# Patient Record
Sex: MALE | Race: OTHER | URBAN - METROPOLITAN AREA
[De-identification: names, ages, dates, MRNs, and addresses within clinical notes are randomized per-mention and may not be internally consistent; named-entity substitution may affect disease eponyms.]

---

## 2018-01-15 ENCOUNTER — EMERGENCY (EMERGENCY)
Facility: HOSPITAL | Age: 23
LOS: 1 days | Discharge: HOME | End: 2018-01-15
Attending: EMERGENCY MEDICINE | Admitting: EMERGENCY MEDICINE
Payer: OTHER GOVERNMENT

## 2018-01-15 ENCOUNTER — EMERGENCY (EMERGENCY)
Facility: HOSPITAL | Age: 23
LOS: 0 days | Discharge: ANOTHER HOSPITAL | End: 2018-01-15
Attending: EMERGENCY MEDICINE
Payer: OTHER GOVERNMENT

## 2018-01-15 VITALS
HEIGHT: 68 IN | TEMPERATURE: 99 F | RESPIRATION RATE: 16 BRPM | SYSTOLIC BLOOD PRESSURE: 158 MMHG | OXYGEN SATURATION: 100 % | DIASTOLIC BLOOD PRESSURE: 111 MMHG | WEIGHT: 190.04 LBS | HEART RATE: 81 BPM

## 2018-01-15 VITALS
HEART RATE: 77 BPM | SYSTOLIC BLOOD PRESSURE: 178 MMHG | RESPIRATION RATE: 16 BRPM | DIASTOLIC BLOOD PRESSURE: 102 MMHG | TEMPERATURE: 98 F | OXYGEN SATURATION: 99 %

## 2018-01-15 VITALS — HEART RATE: 89 BPM | DIASTOLIC BLOOD PRESSURE: 94 MMHG | RESPIRATION RATE: 18 BRPM | SYSTOLIC BLOOD PRESSURE: 139 MMHG

## 2018-01-15 VITALS
TEMPERATURE: 98 F | HEART RATE: 80 BPM | DIASTOLIC BLOOD PRESSURE: 110 MMHG | SYSTOLIC BLOOD PRESSURE: 160 MMHG | OXYGEN SATURATION: 100 % | RESPIRATION RATE: 18 BRPM

## 2018-01-15 DIAGNOSIS — H05.232 HEMORRHAGE OF LEFT ORBIT: ICD-10-CM
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
## 2018-01-15 DIAGNOSIS — S02.32XS FRACTURE OF ORBITAL FLOOR, LEFT SIDE, SEQUELA: ICD-10-CM

## 2018-01-15 DIAGNOSIS — M79.89 OTHER SPECIFIED SOFT TISSUE DISORDERS: ICD-10-CM

## 2018-01-15 LAB
ALBUMIN SERPL ELPH-MCNC: 4.3 G/DL — SIGNIFICANT CHANGE UP (ref 3.3–5)
ALP SERPL-CCNC: 113 U/L — SIGNIFICANT CHANGE UP (ref 40–120)
ALT FLD-CCNC: 187 U/L — HIGH (ref 12–78)
ANION GAP SERPL CALC-SCNC: 8 MMOL/L — SIGNIFICANT CHANGE UP (ref 5–17)
APTT BLD: 34.1 SEC — SIGNIFICANT CHANGE UP (ref 27.5–37.4)
AST SERPL-CCNC: 77 U/L — HIGH (ref 15–37)
BASOPHILS # BLD AUTO: 0.1 K/UL — SIGNIFICANT CHANGE UP (ref 0–0.2)
BASOPHILS NFR BLD AUTO: 1.2 % — SIGNIFICANT CHANGE UP (ref 0–2)
BILIRUB SERPL-MCNC: 0.5 MG/DL — SIGNIFICANT CHANGE UP (ref 0.2–1.2)
BUN SERPL-MCNC: 12 MG/DL — SIGNIFICANT CHANGE UP (ref 7–23)
CALCIUM SERPL-MCNC: 9.5 MG/DL — SIGNIFICANT CHANGE UP (ref 8.5–10.1)
CHLORIDE SERPL-SCNC: 103 MMOL/L — SIGNIFICANT CHANGE UP (ref 96–108)
CO2 SERPL-SCNC: 25 MMOL/L — SIGNIFICANT CHANGE UP (ref 22–31)
CREAT SERPL-MCNC: 0.81 MG/DL — SIGNIFICANT CHANGE UP (ref 0.5–1.3)
EOSINOPHIL # BLD AUTO: 0.2 K/UL — SIGNIFICANT CHANGE UP (ref 0–0.5)
EOSINOPHIL NFR BLD AUTO: 1.8 % — SIGNIFICANT CHANGE UP (ref 0–6)
GLUCOSE SERPL-MCNC: 98 MG/DL — SIGNIFICANT CHANGE UP (ref 70–99)
HCT VFR BLD CALC: 45.9 % — SIGNIFICANT CHANGE UP (ref 39–50)
HGB BLD-MCNC: 15.2 G/DL — SIGNIFICANT CHANGE UP (ref 13–17)
INR BLD: 1.07 RATIO — SIGNIFICANT CHANGE UP (ref 0.88–1.16)
LYMPHOCYTES # BLD AUTO: 28.2 % — SIGNIFICANT CHANGE UP (ref 13–44)
LYMPHOCYTES # BLD AUTO: 3.5 K/UL — HIGH (ref 1–3.3)
MCHC RBC-ENTMCNC: 29.8 PG — SIGNIFICANT CHANGE UP (ref 27–34)
MCHC RBC-ENTMCNC: 33.2 GM/DL — SIGNIFICANT CHANGE UP (ref 32–36)
MCV RBC AUTO: 89.9 FL — SIGNIFICANT CHANGE UP (ref 80–100)
MONOCYTES # BLD AUTO: 0.7 K/UL — SIGNIFICANT CHANGE UP (ref 0–0.9)
MONOCYTES NFR BLD AUTO: 5.6 % — SIGNIFICANT CHANGE UP (ref 2–14)
NEUTROPHILS # BLD AUTO: 7.9 K/UL — HIGH (ref 1.8–7.4)
NEUTROPHILS NFR BLD AUTO: 63.3 % — SIGNIFICANT CHANGE UP (ref 43–77)
PLATELET # BLD AUTO: 383 K/UL — SIGNIFICANT CHANGE UP (ref 150–400)
POTASSIUM SERPL-MCNC: 4 MMOL/L — SIGNIFICANT CHANGE UP (ref 3.5–5.3)
POTASSIUM SERPL-SCNC: 4 MMOL/L — SIGNIFICANT CHANGE UP (ref 3.5–5.3)
PROT SERPL-MCNC: 8.6 GM/DL — HIGH (ref 6–8.3)
PROTHROM AB SERPL-ACNC: 11.6 SEC — SIGNIFICANT CHANGE UP (ref 9.8–12.7)
RBC # BLD: 5.1 M/UL — SIGNIFICANT CHANGE UP (ref 4.2–5.8)
RBC # FLD: 12.3 % — SIGNIFICANT CHANGE UP (ref 10.3–14.5)
SODIUM SERPL-SCNC: 136 MMOL/L — SIGNIFICANT CHANGE UP (ref 135–145)
WBC # BLD: 12.4 K/UL — HIGH (ref 3.8–10.5)
WBC # FLD AUTO: 12.4 K/UL — HIGH (ref 3.8–10.5)

## 2018-01-15 PROCEDURE — 99284 EMERGENCY DEPT VISIT MOD MDM: CPT

## 2018-01-15 PROCEDURE — 99285 EMERGENCY DEPT VISIT HI MDM: CPT

## 2018-01-15 PROCEDURE — 70487 CT MAXILLOFACIAL W/DYE: CPT | Mod: 26

## 2018-01-15 RX ORDER — SODIUM CHLORIDE 9 MG/ML
1000 INJECTION INTRAMUSCULAR; INTRAVENOUS; SUBCUTANEOUS ONCE
Qty: 0 | Refills: 0 | Status: COMPLETED | OUTPATIENT
Start: 2018-01-15 | End: 2018-01-15

## 2018-01-15 RX ADMIN — SODIUM CHLORIDE 1000 MILLILITER(S): 9 INJECTION INTRAMUSCULAR; INTRAVENOUS; SUBCUTANEOUS at 15:15

## 2018-01-15 RX ADMIN — Medication 1 DROP(S): at 14:05

## 2018-01-15 NOTE — ED PROVIDER NOTE - ATTENDING CONTRIBUTION TO CARE
Patient with left eye bleeding today with mild difficulty moving left eye and having normal IOP at OSH.  IOP within normal limits here as well  Ophthalmology recommendations appreciated: - no acute ophthalmological intervention  - avoid blood thinners or NSAIDS  - patient should follow up tomorrow with oculoplastics for further workup and   No immediate life threatening issues present on history or clinical exam. Patient is a safe disposition home, has capacity and insight into their condition, ambulatory in the emergency department and will follow up with their doctor(s) this week. Patient  understands anticipatory guidance and was given strict return and follow up precautions. The patient has been informed of all concerning signs and symptoms to return to Emergency Department, the necessity to follow up with the PMD/Clinic/follow up provided within 2-3 days was explained, and the patient reports understanding of above with capacity and insight.

## 2018-01-15 NOTE — CONSULT NOTE ADULT - SUBJECTIVE AND OBJECTIVE BOX
[facility] Ophthalmology Consult Note    HPI: 23yo M with history of L orbital floor fracture 2/2 heavy object falling onto L eye, s/p repair by plastic surgery in [place] in 7/2017 p/w epistaxis this morning while taking a test. CT maxillofacial at [place] showed hemorrhage in inferior aspect of L orbit with extention into L maxillary sinus and L nasal cavity. The hemorrhage also has mass effect on L inferior rectus muscle. Pt has mild "straining" pain with EOM on left lateral gaze. No changes in vision or any other complaints    PMH: HTN  Meds: lisinopril  POcHx (including surgeries/lasers/trauma):  L orbital floor fracture s/p repair in 7/2017  Drops: None  FamHx: None  Social Hx: None  Allergies: NKDA    ROS:  General (neg), Vision (per HPI), Head and Neck (neg), Pulm (neg), CV (neg), GI (neg),  (neg), Musculoskeletal (neg), Skin/Integ (neg), Neuro (neg), Endocrine (neg), Heme (neg), All/Immuno (neg)    Mood and Affect Appropriate ( x3),  Oriented to Time, Place, and Person x 3     Ophthalmology Exam    Visual acuity cc near: 20/20 OU  Pupils: PERRL OU, no APD  Ttono: 13 OU  Extraocular movements (EOMs): OD 0% upgaze, otherwise full. OS 85% upgaze otherwise full.  + diplopia upgaze  Confrontational Visual Field (CVF):  Full OU    Pen Light Exam (PLE)  External:  Flat OU, no ptosis, no resistance to retropulsion. Enophthalmos OS.  Lids/Lashes/Lacrimal Ducts: Flat OU    Sclera/Conjunctiva:  W+Q OU  Cornea: Cl OU  Anterior Chamber: D+F OU  Iris:  Flat OU  Lens:  Cl OU    Fundus Exam: dilated with 1% tropicamide and 2.5% phenylephrine @   Approval obtained from primary team for dilation  Patient aware that pupils can remained dilated for at least 4-6 hours  Exam performed with 20D lens    Vitreous: wnl OU  Cup/Disc: 0.3 OU  Macula:  wnl OU  Vessels:  wnl OU  Periphery: wnl OU    Diagnostic Testing: CT maxillofacial:   Status post prior left orbital floor fracture with marked distraction of   the remaining left orbital floor bony structures, query failure of   previously placed surgical material and clinical correlation for this   possibility is needed. There is acute hemorrhage layering within the   inferior aspect of the left orbit measuring up to 0.8 cm in the CC   dimension x 3.2 cm in the TR dimension. The hemorrhage in the inferior   aspect of the left orbit results in mild to moderate mass effect on the   left inferior rectus muscle with medial displacement of the muscle. The   left globe and the rest of the extraocular muscles and left optic nerve   appear unremarkable. Hemorrhage extends into the left maxillary sinus.   There is prior fracture of the left lamina per patient with hemorrhage   extending into the left nasal cavity.      Assessment/Plan: 23yo M with history of L orbital floor fracture s/p repair by plastic surgery in [place] in 7/2017 p/w epistaxis this morning. CT maxillofacial showed hemorrhage in inferior aspect of L orbit with extension into L maxillary sinus and L nasal cavity. The hemorrhage also has mass effect on L inferior rectus muscle. VA is 20/20 OU, with normal IOP. No resistance to retropulsion. Etiology of hemorrhage is likely from site of previous floor fracture repair, no concern for compartment syndrome given normal IOP and acute hemorrhage has stopped and displaced into maxillary sinus. EOM OS with only 85% upgaze likely 2/2 mass effect from hematoma, no pain with EOM otherwise. EOM OD with 0% upgaze without ptosis or pupillary involvement. This is likely subacute vs chronic vs congenital, pt has no complaints and can be worked up as outpatient.   - no acute ophthalmological intervention  - avoid blood thinners or NSAIDS  - patient should follow up tomorrow with oculoplastics for further workup and management    Dr. Alva Goode (oculoplastics)  [address], [address]  [facility] [address]  Phone [phone]            Follow-Up:  Patient should follow up in the [facility] within 1 week of discharge.  [address] [place] [address]  [phone] (practice) or [phone] (clinic)    S/D/W Dr Thomas (attending)

## 2018-01-15 NOTE — ED STATDOCS - MEDICAL DECISION MAKING DETAILS
21 y/o m with hx of left orbital floor fx presents s/p episode of epistaxis now with left periorbital swelling, pain with extraocular movement and visual changes, concerning for potential post surgical complication and potential nerve or muscle entrapment. Also consider infectious etiology. To obtain basic labs, CT orbit and max face to perform more in depth exam. 21 y/o m with hx of left orbital floor fx presents s/p episode of epistaxis now with left periorbital swelling, pain with extraocular movement and visual changes, concerning for potential post surgical complication and potential nerve or muscle entrapment. With epistaxis but no clear source of bleeding. Also consider infectious etiology. To obtain basic labs, CT orbit and max face to perform more in depth ocular exam.

## 2018-01-15 NOTE — ED ADULT NURSE REASSESSMENT NOTE - NS ED NURSE REASSESS COMMENT FT1
pt resitng comfortably in chair. brahitng si even and unlabored. pt awaiting transport to [facility]. will continue to monitor and reassess

## 2018-01-15 NOTE — ED STATDOCS - EYES, MLM
Pupils equal, round, and reactive to light. Left periorbital edema. Extraoccular movements intact but pt expresses pain and "tightness" with leftward and upward gaze of left eye. No foreign body upon eversion of lids.

## 2018-01-15 NOTE — ED STATDOCS - OBJECTIVE STATEMENT
23 y/o m with hx of HTN, eye surgery on left eye orbital floor fracture in July from large object falling on his left eye. Pt states he had a plate  during surgery. Pt was taking a test today when his nose began to bleed out of the left nare and pt began having difficulty keeping eyes open. Pt has difficulty with quick eye movements due to tightness and pain today. +dizziness. Girlfriend reports eye is more swollen than usual and that eye does not fully close while sleeping. Pt reports crusting around eyes in the morning since the surgery. Denies N/V, fevers, chills. 23 y/o m with hx of HTN, eye surgery on left eye orbital floor fracture in July from large object falling on his left eye. Pt states he had a plate inserted during surgery. Pt was taking a test today when his nose began to bleed out of the left nare and pt began having difficulty keeping his L eye open. Pt has difficulty with quick eye movements due to tightness and pain. +dizziness. did have double vision earlier following epistaxis but now resolved. Girlfriend reports eye is more swollen than usual and that eye does not fully close while sleeping. Pt reports crusting around eyes in the morning since the surgery. Denies N/V, fevers, chills.

## 2018-01-15 NOTE — ED PROVIDER NOTE - PHYSICAL EXAMINATION
Gen: Well appearing, NAD  Head: NCAT  HEENT: MMM, Normal conjunctiva  Lung: CTAB, no rales, rhonchi or wheezing  CV: RRR, no murmurs, rubs or gallops  Abd: soft, NTND, no rebound or guarding  MSK: No CVA tenderness. No edema, no visible deformities  Neuro: No focal neurologic deficits.   Skin: Warm and dry, no evidence of rash  Psych: normal mood and affect, A&Ox3 Gen: Well appearing, NAD  Head: NCAT  HEENT: MMM, Normal conjunctiva. Left eye pain with movement. Mild left lid swelling  Lung: CTAB, no rales, rhonchi or wheezing  CV: RRR, no murmurs, rubs or gallops  Abd: soft, NTND, no rebound or guarding  MSK: No CVA tenderness. No edema, no visible deformities  Neuro: No focal neurologic deficits.   Skin: Warm and dry, no evidence of rash  Psych: normal mood and affect, A&Ox3

## 2018-01-15 NOTE — ED STATDOCS - PROGRESS NOTE DETAILS
Patient seen and evaluated.  VA in b/l eyes corrected with glasses is 20/35.  L eye stained with fluoroscein and examined with woods lamp, no acute abnormalities but exam slightly limited secondary to periorbital swelling.  IOP 16 bilaterally.  Patient awaiting CT -Guanako Escalera PA-C Ct results reviewed with patient, consult called in to plastic surgeon.  Awaiting call back -Guanako Escalera PA-C Plastic surgeon on call Dr. Mendoza recommends discussing case with optho.  Will likely need transfer as this is not available at this facility.  Dr. Hong on phone with transfer center at this time -Guanako Escalera PA-C Plastic surgeon on call Dr. Mendoza recommends discussing case with optho.  Will likely need transfer as this is not available at this facility.  Dr. Hong on phone with transfer center at this time.  Patient PE unchanged from prior progess note.  Vision intact. -Guanako Escalera PA-C Patient will have an ER to ER transfer for an ophtho consult -Guanako Escalera PA-C patient with continued symptoms of pain/straining upon EOM, but without changes in vision. discussed with plastics, suzi, who feels that patient needs both plastic surgery and ophtho eval, which cannot be done at . presence of retrobulbar hemorrhage, along with continued symptoms are concerning as patient could have worsening of bleeding with subsequent neurovascular compromise. case discussed multiple times with ophtho resident Page, who states she spoke to her attending, attending is comfortable with d/c and f/u. I asked for contact info for attending to discuss in more depth, Dr. Abel states that she already spoke to attending, that if there is further concern that patient can be transferred for full ophtho eval. pt to be transferred to [facility], accepted by Dr. Phoenix. Case d/w Dr. Phoenix. plan discussed with patient, who is agreeable, patient did not feel comfortable with d/c without further eval by specialist. - Lake Hong MD

## 2018-01-15 NOTE — ED ADULT NURSE NOTE - OBJECTIVE STATEMENT
23 y/o M, PMH L orbital floor fracture in June while in the , repaired with mesh, BIBA transfer from [place] for optho. Pt reports waking up with epistaxis, pain with movement of eye and L eye swelling. Pt currently c/o 1/10 L eye pain. Swelling noted to periorbital area. No drainage/bleeding noted to L eye. no epistaxis at this time. Pt denies headache, dizziness, chest pain, palpitations, cough, SOB, fevers, chills, weakness at this time. Family at bedside, safety maintained.

## 2018-01-15 NOTE — ED ADULT NURSE NOTE - DISCHARGE TEACHING
Emani LAURENT, pt verbalizes understanding to f/u with Optho and return to ED for any worsening symptoms.

## 2018-01-15 NOTE — ED STATDOCS - CARE PLAN
Principal Discharge DX:	Orbital fracture, sequela  Secondary Diagnosis:	Facial swelling  Secondary Diagnosis:	Orbital hemorrhage, left

## 2018-01-15 NOTE — ED PROVIDER NOTE - OBJECTIVE STATEMENT
22 y.o. male sp left eye injury and surgery for orbital floor fracture after getting hit in eye with large object in July pw epistaxis and difficulty moving left eye, transfer from OSH, found to have leaking mesh into maxillary sinus, sent for optho consult. IOP at OSH 16. Vision intact there.

## 2018-01-15 NOTE — ED ADULT NURSE REASSESSMENT NOTE - NS ED NURSE REASSESS COMMENT FT1
verbal report given to low LAURENT in transfer center. pt awaiting transport to [facility]. NAD VSS

## 2018-01-15 NOTE — ED PROVIDER NOTE - NS ED ROS FT
ROS: denies HA, weakness, dizziness, fevers/chills, nausea/vomiting, chest pain, SOB, diaphoresis, abdominal pain, back/neck pain, dysuria/hematuria, or rash  +epistaxis, left eye pain with movement

## 2018-01-15 NOTE — ED STATDOCS - ATTENDING CONTRIBUTION TO CARE
I, Lake Hong MD,  performed the initial face to face bedside interview with this patient regarding history of present illness, review of symptoms and relevant past medical, social and family history.  I completed an independent physical examination.  I was the initial provider who evaluated this patient. I have signed out the follow up of any pending tests (i.e. labs, radiological studies) to the ACP.  I have communicated the patient’s plan of care and disposition with the ACP.  The history, relevant review of systems, past medical and surgical history, medical decision making, and physical examination was documented by the scribe in my presence and I attest to the accuracy of the documentation.

## 2018-01-16 DIAGNOSIS — Z98.890 OTHER SPECIFIED POSTPROCEDURAL STATES: Chronic | ICD-10-CM

## 2018-01-20 PROBLEM — Z00.00 ENCOUNTER FOR PREVENTIVE HEALTH EXAMINATION: Status: ACTIVE | Noted: 2018-01-20

## 2018-01-22 ENCOUNTER — APPOINTMENT (OUTPATIENT)
Dept: OPHTHALMOLOGY | Facility: CLINIC | Age: 23
End: 2018-01-22

## 2019-01-25 ENCOUNTER — APPOINTMENT (OUTPATIENT)
Dept: NEUROLOGY | Facility: CLINIC | Age: 24
End: 2019-01-25
Payer: COMMERCIAL

## 2019-01-25 VITALS
WEIGHT: 204 LBS | HEART RATE: 96 BPM | DIASTOLIC BLOOD PRESSURE: 92 MMHG | SYSTOLIC BLOOD PRESSURE: 144 MMHG | BODY MASS INDEX: 29.87 KG/M2 | HEIGHT: 69.25 IN

## 2019-01-25 DIAGNOSIS — G43.009 MIGRAINE W/OUT AURA, NOT INTRACTABLE, W/OUT STATUS MIGRAINOSUS: ICD-10-CM

## 2019-01-25 DIAGNOSIS — R41.840 ATTENTION AND CONCENTRATION DEFICIT: ICD-10-CM

## 2019-01-25 DIAGNOSIS — Z82.49 FAMILY HISTORY OF ISCHEMIC HEART DISEASE AND OTHER DISEASES OF THE CIRCULATORY SYSTEM: ICD-10-CM

## 2019-01-25 DIAGNOSIS — Z78.9 OTHER SPECIFIED HEALTH STATUS: ICD-10-CM

## 2019-01-25 PROCEDURE — 99204 OFFICE O/P NEW MOD 45 MIN: CPT

## 2019-01-25 NOTE — HISTORY OF PRESENT ILLNESS
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 Usually has to lay down in a quiet room. Triggers appear to be stress and fatigue, missing meals. Next sumatriptan 50 mg, Excedrin with some relief.Also complains of left TMJ pain, at times it seems to lockup.\par He also reports difficulty with attention, much more distractible than the past, no confusion to her episode of passing out.

## 2019-01-25 NOTE — PHYSICAL EXAM
[General Appearance - Alert] : alert [General Appearance - In No Acute Distress] : in no acute distress [Oriented To Time, Place, And Person] : oriented to person, place, and time [Impaired Insight] : insight and judgment were intact [Affect] : the affect was normal [Person] : oriented to person [Place] : oriented to place [Time] : oriented to time [Concentration Intact] : normal concentrating ability [Visual Intact] : visual attention was ~T not ~L decreased [Naming Objects] : no difficulty naming common objects [Repeating Phrases] : no difficulty repeating a phrase [Writing A Sentence] : no difficulty writing a sentence [Fluency] : fluency intact [Comprehension] : comprehension intact [Reading] : reading intact [Past History] : adequate knowledge of personal past history [Cranial Nerves Optic (II)] : visual acuity intact bilaterally,  visual fields full to confrontation, pupils equal round and reactive to light [Cranial Nerves Oculomotor (III)] : extraocular motion intact [Cranial Nerves Facial (VII)] : face symmetrical [Cranial Nerves Vestibulocochlear (VIII)] : hearing was intact bilaterally [Cranial Nerves Glossopharyngeal (IX)] : tongue and palate midline [Cranial Nerves Accessory (XI - Cranial And Spinal)] : head turning and shoulder shrug symmetric [Cranial Nerves Hypoglossal (XII)] : there was no tongue deviation with protrusion [Normal Masseter Strength] : normal masseter strength [Facial Sensation Decrease - V2 Right Only] : normal over the side of the nose, infraorbital area, and upper lip [Facial Sensation Decrease - V3 Right Only] : normal over the chin and lower lip [Facial Sensation Decrease - V2 Left Only] : decreased over the side of the nose, infraorbital area, and upper lip [Facial Sensation Decrease - V3 Left Only] : normal over the chin and lower lip [Motor Tone] : muscle tone was normal in all four extremities [Motor Strength] : muscle strength was normal in all four extremities [No Muscle Atrophy] : normal bulk in all four extremities [Motor Handedness Right-Handed] : the patient is right hand dominant [Paresis Pronator Drift Right-Sided] : no pronator drift on the right [Paresis Pronator Drift Left-Sided] : no pronator drift on the left [Sensation Tactile Decrease] : light touch was intact [Sensation Pain / Temperature Decrease] : pain and temperature was intact [Abnormal Walk] : normal gait [Balance] : balance was intact [Past-pointing] : there was no past-pointing [Tremor] : no tremor present [2+] : Ankle jerk left 2+ [Plantar Reflex Right Only] : normal on the right [Plantar Reflex Left Only] : normal on the left [Sclera] : the sclera and conjunctiva were normal [PERRL With Normal Accommodation] : pupils were equal in size, round, reactive to light, with normal accommodation [Extraocular Movements] : extraocular movements were intact [Outer Ear] : the ears and nose were normal in appearance [Hearing Threshold Finger Rub Not Garvin] : hearing was normal [Neck Appearance] : the appearance of the neck was normal [] : the neck was supple [Respiration, Rhythm And Depth] : normal respiratory rhythm and effort [Exaggerated Use Of Accessory Muscles For Inspiration] : no accessory muscle use [Heart Rate And Rhythm] : heart rate was normal and rhythm regular

## 2019-01-25 NOTE — REVIEW OF SYSTEMS
[As Noted in HPI] : as noted in HPI [Numbness] : numbness [Abnormal Sensation] : an abnormal sensation [Negative] : Heme/Lymph

## 2019-01-25 NOTE — DATA REVIEWED
[de-identified] : IMPRESSION:  Status post prior left orbital floor fracture with marked distraction of  the remaining left orbital floor bony structures, query failure of  previously placed surgical material and clinical correlation for this  possibility is needed. There is acute hemorrhage layering within the  inferior aspect of the left orbit measuring up to 0.8 cm in the CC  dimension x 3.2 cm in the TR dimension. The hemorrhage in the inferior  aspect of the left orbit results in mild to moderate mass effect on the  left inferior rectus muscle with medial displacement of the muscle. The  left globe and the rest of the extraocular muscles and left optic nerve  appear unremarkable. Hemorrhage extends into the left maxillary sinus.  There is prior fracture of the left lamina per patient with hemorrhage  extending into the left nasal cavity

## 2019-01-25 NOTE — DISCUSSION/SUMMARY
[FreeTextEntry1] : 23-year-old man with history of history of head injury, facial fracture, presenting with recurrent headaches. probably migraine headaches. Complains of difficulty with attention, concentration, reportedly resulting after the above head injury. Discussion and reviewed possible treatments.\par plan: Start amitriptyline 25 mg p.o. h.s.,after one week if tolerable, and increase to 50 mg q.h.s.\par Increase sumatriptan 10 mg p.o. p.r.n. headache, can repeat in 2 hours.\par Wi'll order neurocognitive testing.Electroencephalograph.

## 2019-03-08 ENCOUNTER — APPOINTMENT (OUTPATIENT)
Dept: NEUROLOGY | Facility: CLINIC | Age: 24
End: 2019-03-08
Payer: COMMERCIAL

## 2019-03-08 VITALS
SYSTOLIC BLOOD PRESSURE: 118 MMHG | HEART RATE: 91 BPM | WEIGHT: 200 LBS | HEIGHT: 69.25 IN | BODY MASS INDEX: 29.29 KG/M2 | DIASTOLIC BLOOD PRESSURE: 80 MMHG

## 2019-03-08 DIAGNOSIS — R51 HEADACHE: ICD-10-CM

## 2019-03-08 PROCEDURE — 96125 COGNITIVE TEST BY HC PRO: CPT | Mod: 59

## 2019-03-08 PROCEDURE — 99214 OFFICE O/P EST MOD 30 MIN: CPT

## 2019-03-08 RX ORDER — SUMATRIPTAN 50 MG/1
50 TABLET, FILM COATED ORAL
Refills: 0 | Status: DISCONTINUED | COMMUNITY
End: 2019-03-08

## 2019-03-08 RX ORDER — SUMATRIPTAN 100 MG/1
100 TABLET, FILM COATED ORAL
Qty: 12 | Refills: 5 | Status: DISCONTINUED | COMMUNITY
Start: 2019-01-25 | End: 2019-03-08

## 2019-03-08 NOTE — PROCEDURE
[FreeTextEntry1] : [facility] neurocognitive testing demonstrates otherwise, her deviation below the average in the following cognitive domain, memory, executive function, tension in information processing speed. He did below-average in verbal function,above of her urge in visual spatial and motor skills.\par Total levels. Client to score was once on the diffusion below-average of a score of

## 2019-03-08 NOTE — DISCUSSION/SUMMARY
[FreeTextEntry1] : 23-year-old man complaining of difficulty with attention, memory, neurocognitive testing for his difficulty with attention, memory, and function. May benefit from treatment of attention difficulties, reviewed and discussed possible options. He wants to wait at this moment.\par \par Recurrent headache, possible migraine headaches, not responding to Elavil 25 mg.\par plan: Increase Elavil to 50 mg q.h.s.\par Discontinue sumatriptan.\par Tried Relpax 40 mg p.o. p.r.n., can repeat in 2 hours.\par We'll request an MRI of the brain, with or without contrast, including internal ear canals\par Referred patient to ENT evaluation, consider VENG.

## 2019-03-08 NOTE — HISTORY OF PRESENT ILLNESS
[FreeTextEntry1] : 23-year-old male here for followup visit, underwent neurocognitive testing, complaining of recurrent headaches, unchanged since last time, started on Elavil 25 mg.Tried sumatriptan 100 mg tablet, without much improvement of his headaches.They remain several times a week, can last several hours, severe enough that he has to lay down.\par reports episodes of transient vertigo, usually with postural changes, feels dizzy, the amy around,usually last a few minutes and goes away. No change in hearing, and ringing in the ears, no ear pains.\par

## 2019-03-08 NOTE — DATA REVIEWED
[de-identified] : Status post prior left orbital floor fracture with marked distraction of  the remaining left orbital floor bony structures, query failure of  previously placed surgical material and clinical correlation for this  possibility is needed. There is acute hemorrhage layering within the  inferior aspect of the left orbit measuring up to 0.8 cm in the CC  dimension x 3.2 cm in the TR dimension. The hemorrhage in the inferior  aspect of the left orbit results in mild to moderate mass effect on the  left inferior rectus muscle with medial displacement of the muscle. The  left globe and the rest of the extraocular muscles and left optic nerve  appear unremarkable. Hemorrhage extends into the left maxillary sinus.  There is prior fracture of the left lamina per patient with hemorrhage  extending into the left nasal cavity.

## 2019-03-22 ENCOUNTER — APPOINTMENT (OUTPATIENT)
Dept: NEUROLOGY | Facility: CLINIC | Age: 24
End: 2019-03-22

## 2019-04-12 ENCOUNTER — APPOINTMENT (OUTPATIENT)
Dept: NEUROLOGY | Facility: CLINIC | Age: 24
End: 2019-04-12
Payer: COMMERCIAL

## 2019-04-12 VITALS
HEIGHT: 69.25 IN | SYSTOLIC BLOOD PRESSURE: 131 MMHG | BODY MASS INDEX: 31.48 KG/M2 | DIASTOLIC BLOOD PRESSURE: 96 MMHG | HEART RATE: 90 BPM | WEIGHT: 215 LBS

## 2019-04-12 PROCEDURE — 99214 OFFICE O/P EST MOD 30 MIN: CPT

## 2019-04-12 RX ORDER — AMITRIPTYLINE HYDROCHLORIDE 25 MG/1
25 TABLET, FILM COATED ORAL
Qty: 60 | Refills: 3 | Status: DISCONTINUED | COMMUNITY
Start: 2019-01-25 | End: 2019-04-12

## 2019-04-12 NOTE — ASSESSMENT
[FreeTextEntry1] : 23-year-old man with recurrent headaches,probable migrainous, questionable TMJ dysfunction.\par Not responding to Elavil 50 mg. Reviewed treatment options.\par Plan: Taper off Elavil, and dropped to 25 mg at bedtime, discontinue after 5 days.\par Start topiramate 50 mg q.h.s.,after one week if tolerated, can increase to 100 mg thereafter.\par Change sumatriptan to 100 mg, one tablet as needed for headache, can repeat in 2 hours.\par Return to office for 6 weeks.

## 2019-04-12 NOTE — PHYSICAL EXAM
[General Appearance - Alert] : alert [General Appearance - In No Acute Distress] : in no acute distress [Person] : oriented to person [Place] : oriented to place [Time] : oriented to time [Fluency] : fluency intact [Comprehension] : comprehension intact [Past History] : adequate knowledge of personal past history [Cranial Nerves Optic (II)] : visual acuity intact bilaterally,  visual fields full to confrontation, pupils equal round and reactive to light [Cranial Nerves Oculomotor (III)] : extraocular motion intact [Cranial Nerves Facial (VII)] : face symmetrical [Cranial Nerves Vestibulocochlear (VIII)] : hearing was intact bilaterally [Cranial Nerves Glossopharyngeal (IX)] : tongue and palate midline [Cranial Nerves Accessory (XI - Cranial And Spinal)] : head turning and shoulder shrug symmetric [Cranial Nerves Hypoglossal (XII)] : there was no tongue deviation with protrusion [Normal Masseter Strength] : normal masseter strength [Facial Sensation Decrease - V2 Right Only] : normal over the side of the nose, infraorbital area, and upper lip [Facial Sensation Decrease - V3 Right Only] : normal over the chin and lower lip [Facial Sensation Decrease - V2 Left Only] : decreased over the side of the nose, infraorbital area, and upper lip [Facial Sensation Decrease - V3 Left Only] : normal over the chin and lower lip [Motor Tone] : muscle tone was normal in all four extremities [Motor Strength] : muscle strength was normal in all four extremities [No Muscle Atrophy] : normal bulk in all four extremities [Motor Handedness Right-Handed] : the patient is right hand dominant [Paresis Pronator Drift Right-Sided] : no pronator drift on the right [Paresis Pronator Drift Left-Sided] : no pronator drift on the left [Sensation Tactile Decrease] : light touch was intact [Sensation Pain / Temperature Decrease] : pain and temperature was intact [Abnormal Walk] : normal gait [Balance] : balance was intact [Past-pointing] : there was no past-pointing [Tremor] : no tremor present [Plantar Reflex Right Only] : normal on the right [2+] : Ankle jerk left 2+ [Plantar Reflex Left Only] : normal on the left

## 2019-04-12 NOTE — HISTORY OF PRESENT ILLNESS
[FreeTextEntry1] : 23-year-old male history of recurrent headaches, history of previous injury to the head and face.With orbital fracture.\par Complains of recurrent headaches, last visit increased amitriptyline to 50 mg at bedtime. Has noted no improvement of his headaches. Still occurring frequently, several times a week. headaches occur mainly over the left side of the head, takes sumatriptan 50 mg tablet, which he reports is not helping.\par Has on occasions noted the left jaw locks open. Will slip back on its on.\par

## 2019-04-18 NOTE — ED ADULT TRIAGE NOTE - NS ED NURSE DIRECT TO ROOM YN
1.) Do you have an Advance directive, living will, or power of  for health care document that contains your wishes for end of life care?:  No     3.) During the past 4 weeks, how would you rate your health?:  Fair     4.) During the past 4 weeks, what was the hardest physical activity you could do for at least 2 minutes?:  Very Light     6 a.) How many servings of Fruits and Vegetables do you have each day ( 1 serving = 1 piece of fruit, 1/2 cup fruits or vegetables):  1 per day     6 b.) How many servings of High Fiber / Whole Grain Foods to you have each day ( 1 serving = 1 cup cold cereal, 1/2 cup cooked cereal, 1 slice bread):  1 per day     11i.) Problems using the telephone:  Sometimes         Recent PHQ 2/9 Score    PHQ 2:  Date Adult PHQ 2 Score   4/18/2019 0       PHQ 9:      Yes

## 2019-04-26 ENCOUNTER — FORM ENCOUNTER (OUTPATIENT)
Age: 24
End: 2019-04-26

## 2019-04-27 ENCOUNTER — OUTPATIENT (OUTPATIENT)
Dept: OUTPATIENT SERVICES | Facility: HOSPITAL | Age: 24
LOS: 1 days | End: 2019-04-27
Payer: COMMERCIAL

## 2019-04-27 ENCOUNTER — APPOINTMENT (OUTPATIENT)
Dept: MRI IMAGING | Facility: CLINIC | Age: 24
End: 2019-04-27
Payer: COMMERCIAL

## 2019-04-27 DIAGNOSIS — Z98.890 OTHER SPECIFIED POSTPROCEDURAL STATES: Chronic | ICD-10-CM

## 2019-04-27 DIAGNOSIS — Z00.8 ENCOUNTER FOR OTHER GENERAL EXAMINATION: ICD-10-CM

## 2019-04-27 PROCEDURE — A9585: CPT

## 2019-04-27 PROCEDURE — 70553 MRI BRAIN STEM W/O & W/DYE: CPT

## 2019-04-27 PROCEDURE — 70553 MRI BRAIN STEM W/O & W/DYE: CPT | Mod: 26

## 2019-05-10 ENCOUNTER — APPOINTMENT (OUTPATIENT)
Dept: NEUROLOGY | Facility: CLINIC | Age: 24
End: 2019-05-10

## 2019-12-06 ENCOUNTER — EMERGENCY (EMERGENCY)
Facility: HOSPITAL | Age: 24
LOS: 0 days | Discharge: HOME | End: 2019-12-07
Attending: EMERGENCY MEDICINE
Payer: COMMERCIAL

## 2019-12-06 VITALS — WEIGHT: 203.05 LBS | HEIGHT: 68 IN

## 2019-12-06 VITALS
RESPIRATION RATE: 18 BRPM | SYSTOLIC BLOOD PRESSURE: 142 MMHG | TEMPERATURE: 99 F | HEART RATE: 99 BPM | DIASTOLIC BLOOD PRESSURE: 99 MMHG

## 2019-12-06 DIAGNOSIS — J01.01 ACUTE RECURRENT MAXILLARY SINUSITIS: ICD-10-CM

## 2019-12-06 DIAGNOSIS — Z98.890 OTHER SPECIFIED POSTPROCEDURAL STATES: Chronic | ICD-10-CM

## 2019-12-06 DIAGNOSIS — H53.8 OTHER VISUAL DISTURBANCES: ICD-10-CM

## 2019-12-06 LAB
ALBUMIN SERPL ELPH-MCNC: 3.8 G/DL — SIGNIFICANT CHANGE UP (ref 3.3–5)
ALP SERPL-CCNC: 117 U/L — SIGNIFICANT CHANGE UP (ref 40–120)
ALT FLD-CCNC: 129 U/L — HIGH (ref 12–78)
ANION GAP SERPL CALC-SCNC: 8 MMOL/L — SIGNIFICANT CHANGE UP (ref 5–17)
APTT BLD: 33.3 SEC — SIGNIFICANT CHANGE UP (ref 27.5–36.3)
AST SERPL-CCNC: 47 U/L — HIGH (ref 15–37)
BASOPHILS # BLD AUTO: 0.1 K/UL — SIGNIFICANT CHANGE UP (ref 0–0.2)
BASOPHILS NFR BLD AUTO: 0.6 % — SIGNIFICANT CHANGE UP (ref 0–2)
BILIRUB SERPL-MCNC: 0.3 MG/DL — SIGNIFICANT CHANGE UP (ref 0.2–1.2)
BUN SERPL-MCNC: 13 MG/DL — SIGNIFICANT CHANGE UP (ref 7–23)
CALCIUM SERPL-MCNC: 9.4 MG/DL — SIGNIFICANT CHANGE UP (ref 8.5–10.1)
CHLORIDE SERPL-SCNC: 107 MMOL/L — SIGNIFICANT CHANGE UP (ref 96–108)
CO2 SERPL-SCNC: 24 MMOL/L — SIGNIFICANT CHANGE UP (ref 22–31)
CREAT SERPL-MCNC: 0.83 MG/DL — SIGNIFICANT CHANGE UP (ref 0.5–1.3)
EOSINOPHIL # BLD AUTO: 0.15 K/UL — SIGNIFICANT CHANGE UP (ref 0–0.5)
EOSINOPHIL NFR BLD AUTO: 0.9 % — SIGNIFICANT CHANGE UP (ref 0–6)
GLUCOSE SERPL-MCNC: 142 MG/DL — HIGH (ref 70–99)
HCT VFR BLD CALC: 43.6 % — SIGNIFICANT CHANGE UP (ref 39–50)
HGB BLD-MCNC: 14.4 G/DL — SIGNIFICANT CHANGE UP (ref 13–17)
IMM GRANULOCYTES NFR BLD AUTO: 0.4 % — SIGNIFICANT CHANGE UP (ref 0–1.5)
INR BLD: 1.09 RATIO — SIGNIFICANT CHANGE UP (ref 0.88–1.16)
LYMPHOCYTES # BLD AUTO: 24.9 % — SIGNIFICANT CHANGE UP (ref 13–44)
LYMPHOCYTES # BLD AUTO: 4.04 K/UL — HIGH (ref 1–3.3)
MCHC RBC-ENTMCNC: 29.8 PG — SIGNIFICANT CHANGE UP (ref 27–34)
MCHC RBC-ENTMCNC: 33 GM/DL — SIGNIFICANT CHANGE UP (ref 32–36)
MCV RBC AUTO: 90.3 FL — SIGNIFICANT CHANGE UP (ref 80–100)
MONOCYTES # BLD AUTO: 1.09 K/UL — HIGH (ref 0–0.9)
MONOCYTES NFR BLD AUTO: 6.7 % — SIGNIFICANT CHANGE UP (ref 2–14)
NEUTROPHILS # BLD AUTO: 10.78 K/UL — HIGH (ref 1.8–7.4)
NEUTROPHILS NFR BLD AUTO: 66.5 % — SIGNIFICANT CHANGE UP (ref 43–77)
PLATELET # BLD AUTO: 397 K/UL — SIGNIFICANT CHANGE UP (ref 150–400)
POTASSIUM SERPL-MCNC: 3.5 MMOL/L — SIGNIFICANT CHANGE UP (ref 3.5–5.3)
POTASSIUM SERPL-SCNC: 3.5 MMOL/L — SIGNIFICANT CHANGE UP (ref 3.5–5.3)
PROT SERPL-MCNC: 7.8 GM/DL — SIGNIFICANT CHANGE UP (ref 6–8.3)
PROTHROM AB SERPL-ACNC: 12.1 SEC — SIGNIFICANT CHANGE UP (ref 10–12.9)
RBC # BLD: 4.83 M/UL — SIGNIFICANT CHANGE UP (ref 4.2–5.8)
RBC # FLD: 12.7 % — SIGNIFICANT CHANGE UP (ref 10.3–14.5)
SODIUM SERPL-SCNC: 139 MMOL/L — SIGNIFICANT CHANGE UP (ref 135–145)
WBC # BLD: 16.23 K/UL — HIGH (ref 3.8–10.5)
WBC # FLD AUTO: 16.23 K/UL — HIGH (ref 3.8–10.5)

## 2019-12-06 PROCEDURE — 85610 PROTHROMBIN TIME: CPT

## 2019-12-06 PROCEDURE — 80053 COMPREHEN METABOLIC PANEL: CPT

## 2019-12-06 PROCEDURE — 96365 THER/PROPH/DIAG IV INF INIT: CPT | Mod: XU

## 2019-12-06 PROCEDURE — 99284 EMERGENCY DEPT VISIT MOD MDM: CPT

## 2019-12-06 PROCEDURE — 36415 COLL VENOUS BLD VENIPUNCTURE: CPT

## 2019-12-06 PROCEDURE — 99284 EMERGENCY DEPT VISIT MOD MDM: CPT | Mod: 25

## 2019-12-06 PROCEDURE — 85025 COMPLETE CBC W/AUTO DIFF WBC: CPT

## 2019-12-06 PROCEDURE — 85730 THROMBOPLASTIN TIME PARTIAL: CPT

## 2019-12-06 PROCEDURE — 70487 CT MAXILLOFACIAL W/DYE: CPT

## 2019-12-06 PROCEDURE — 70487 CT MAXILLOFACIAL W/DYE: CPT | Mod: 26

## 2019-12-06 PROCEDURE — 70450 CT HEAD/BRAIN W/O DYE: CPT

## 2019-12-06 PROCEDURE — 70450 CT HEAD/BRAIN W/O DYE: CPT | Mod: 26

## 2019-12-06 RX ORDER — AMPICILLIN SODIUM AND SULBACTAM SODIUM 250; 125 MG/ML; MG/ML
3 INJECTION, POWDER, FOR SUSPENSION INTRAMUSCULAR; INTRAVENOUS ONCE
Refills: 0 | Status: COMPLETED | OUTPATIENT
Start: 2019-12-06 | End: 2019-12-06

## 2019-12-06 RX ADMIN — AMPICILLIN SODIUM AND SULBACTAM SODIUM 200 GRAM(S): 250; 125 INJECTION, POWDER, FOR SUSPENSION INTRAMUSCULAR; INTRAVENOUS at 23:00

## 2019-12-06 NOTE — ED STATDOCS - OBJECTIVE STATEMENT
23 y/o male with a PMHx of Left orbital floor fx s/p repair in 2017, presents to the ED c/o L eye blurry vision x 1 week. States he has double vision in L eye with blurry vision which worsened today.  States he had severe L temporal headache radiating down face. +associated nausea. As per family member, pt felt near syncopal 2 nights ago. Was told he is risk of glaucoma. [facility]

## 2019-12-06 NOTE — ED STATDOCS - CARE PROVIDER_API CALL
Lucio Lee)  Otolaryngology  [address] [place] [address]  Phone: [phone]  Fax: [phone]  Follow Up Time:

## 2019-12-06 NOTE — ED STATDOCS - ATTENDING CONTRIBUTION TO CARE
I, Reese Hansen, performed the initial face to face bedside interview with this patient regarding history of present illness, review of symptoms and relevant past medical, social and family history.  I completed an independent physical examination.  I was the initial provider who evaluated this patient. I have signed out the follow up of any pending tests (i.e. labs, radiological studies) to the ACP.  I have communicated the patient’s plan of care and disposition with the ACP.  The history, relevant review of systems, past medical and surgical history, medical decision making, and physical examination was documented by the scribe in my presence and I attest to the accuracy of the documentation.

## 2019-12-06 NOTE — ED ADULT TRIAGE NOTE - CHIEF COMPLAINT QUOTE
patient has history of left orbital floor fracture repaired in 2017. was told he is glaucoma risk   initially had congestion this morning then started to experience double vision in left eye earlier today with increased pressure and headache to left side of head.

## 2019-12-06 NOTE — ED ADULT NURSE NOTE - OBJECTIVE STATEMENT
pt presents to the ED c/o L eye blurry vision x 1 week. States he has double vision in L eye with blurry vision which worsened today.  States he had severe L temporal headache radiating down face. +associated nausea. As per family member, pt felt near syncopal 2 nights ago. Was told he is risk of glaucoma. Pt AOx4, breathing symmetrical and unlabored, in no acute distress at this time.

## 2019-12-06 NOTE — ED STATDOCS - PROGRESS NOTE DETAILS
23 y/o male with a PMHx of Left orbital floor fx s/p repair in 2017, presents to the ED c/o L eye blurry vision x 1 week. States he has double vision in L eye with blurry vision which worsened today.  States he had severe L temporal headache radiating down face. +associated nausea. As per family member, pt felt near syncopal 2 nights ago. Was told he is risk of glaucoma. [facility]   [facility]ov[facility]  IOP 9 25 y/o male with a PMHx of Left orbital floor fx s/p repair in 2017, presents to the ED c/o L eye blurry vision x 1 week. States he has double vision in L eye with blurry vision which worsened today.  States he had severe L temporal headache radiating down face. +associated nausea. As per family member, pt felt near syncopal 2 nights ago. Was told he is risk of glaucoma. [facility]   Surgery for orbital repair in [place] 2017.    Geovanna Epstein PA-C Awaiting CT.  Unasyn ordered.  Mild TTP over left maxillary sinus with EOM intact.  Pt. feeling "pressure" to medial left eye with movement medially.  will continue to monitor.  Geovanna Epstein PA-C Pt improved has had this before and [facility], [facility]o and neuro there, told him about the results and will follow up with his doctor there, abx and nasal spray. Precautions reviewed.

## 2019-12-06 NOTE — ED STATDOCS - EYES, MLM
clear bilaterally.  Pupils equal, round, and reactive to light. vision 20/20. extra ocular pressure is 9. clear bilaterally.  Pupils equal, round, and reactive to light. vision 20/20. IOP is 9.

## 2019-12-06 NOTE — ED STATDOCS - PATIENT PORTAL LINK FT
You can access the FollowMyHealth Patient Portal offered by [facility] by registering at the following website: [email or web address]. By joining [facility]’s FollowMyHealth portal, you will also be able to view your health information using other applications (apps) compatible with our system.

## 2019-12-07 RX ORDER — MOMETASONE FUROATE 50 UG/1
2 SPRAY NASAL
Qty: 1 | Refills: 0
Start: 2019-12-07 | End: 2019-12-16

## 2019-12-07 RX ADMIN — AMPICILLIN SODIUM AND SULBACTAM SODIUM 3 GRAM(S): 250; 125 INJECTION, POWDER, FOR SUSPENSION INTRAMUSCULAR; INTRAVENOUS at 00:41

## 2019-12-16 NOTE — ED ADULT NURSE NOTE - CAS EDP DISCH TYPE
Home Azathioprine Pregnancy And Lactation Text: This medication is Pregnancy Category D and isn't considered safe during pregnancy. It is unknown if this medication is excreted in breast milk.

## 2020-02-20 PROBLEM — Z78.9 OTHER SPECIFIED HEALTH STATUS: Chronic | Status: ACTIVE | Noted: 2019-12-07

## 2020-02-21 ENCOUNTER — APPOINTMENT (OUTPATIENT)
Dept: ORTHOPEDIC SURGERY | Facility: CLINIC | Age: 25
End: 2020-02-21
Payer: COMMERCIAL

## 2020-02-21 VITALS
HEART RATE: 74 BPM | WEIGHT: 215 LBS | DIASTOLIC BLOOD PRESSURE: 121 MMHG | HEIGHT: 69.25 IN | BODY MASS INDEX: 31.48 KG/M2 | SYSTOLIC BLOOD PRESSURE: 156 MMHG

## 2020-02-21 DIAGNOSIS — R20.2 PARESTHESIA OF SKIN: ICD-10-CM

## 2020-02-21 DIAGNOSIS — M79.642 PAIN IN LEFT HAND: ICD-10-CM

## 2020-02-21 PROCEDURE — 99204 OFFICE O/P NEW MOD 45 MIN: CPT

## 2020-02-21 NOTE — PHYSICAL EXAM
[Normal Finger/nose] : finger to nose coordination [Normal] : no peripheral adenopathy appreciated [de-identified] : Left arm exam\par \par Skin is intact with no erythema or ecchymosis. There is no swelling. There are no gross deformities. Range of motion of the elbow, wrist, and digits is fully intact with flexion and extension, pronation and supination. There is no tenderness on palpation of the elbow over the lateral and medial epicondyles. There is no olecranon tenderness. There is a positive Tinel's over the ulnar nerve at the elbow. There is no tenderness about the wrist, however there is tenderness of the distal forearm at the second through fourth dorsal compartments. There is no pain with forced flexion/extension of the wrist. There is a negative Phalen's and Tinel's at the wrist. There is no snuffbox, as SL interval, ulnar fovea pain. The DRUJ is stable. Sensation is grossly intact and distal pulses are 2+. [de-identified] : FRANKIR

## 2020-02-21 NOTE — ASSESSMENT
[FreeTextEntry1] : Patient with a vague left arm pain and paresthesias. The nature of this condition is slightly unclear at this time. I would like to obtain an EMG to rule out any nerve pathology. The patient cannot take anti-inflammatory medications and was recommended to take Tylenol for any discomfort. The patient will follow back up after the EMG to discuss the results and further treatment options. She Tylenol not improve his symptoms, he will call the office for possible steroid prescription. The patient is in agreement with this plan.

## 2020-02-21 NOTE — HISTORY OF PRESENT ILLNESS
[FreeTextEntry1] : 02/21/2020: The patient is a 24-year-old right-hand-dominant male who presents to the office with pain at the left elbow that radiates down the arm and the ulnar nerve distribution. This is been occurring for the past 2 days. He states that there was no definitive injury. It is constant sharp, shooting, and stabbing-type pain. He does not take any medications to alleviate his symptoms. The pain is associated with vague paresthesias in the ulnar nerve distribution.

## 2020-10-14 ENCOUNTER — EMERGENCY (EMERGENCY)
Facility: HOSPITAL | Age: 25
LOS: 0 days | Discharge: HOME | End: 2020-10-14
Attending: EMERGENCY MEDICINE
Payer: OTHER GOVERNMENT

## 2020-10-14 ENCOUNTER — EMERGENCY (EMERGENCY)
Facility: HOSPITAL | Age: 25
LOS: 0 days | Discharge: OTHER INSTITUTION | End: 2020-10-14
Attending: STUDENT IN AN ORGANIZED HEALTH CARE EDUCATION/TRAINING PROGRAM
Payer: COMMERCIAL

## 2020-10-14 ENCOUNTER — EMERGENCY (EMERGENCY)
Facility: HOSPITAL | Age: 25
LOS: 1 days | Discharge: HOME | End: 2020-10-14
Attending: STUDENT IN AN ORGANIZED HEALTH CARE EDUCATION/TRAINING PROGRAM | Admitting: EMERGENCY MEDICINE
Payer: COMMERCIAL

## 2020-10-14 VITALS
HEART RATE: 75 BPM | DIASTOLIC BLOOD PRESSURE: 90 MMHG | TEMPERATURE: 98 F | RESPIRATION RATE: 18 BRPM | SYSTOLIC BLOOD PRESSURE: 150 MMHG | OXYGEN SATURATION: 99 %

## 2020-10-14 VITALS
OXYGEN SATURATION: 100 % | SYSTOLIC BLOOD PRESSURE: 150 MMHG | DIASTOLIC BLOOD PRESSURE: 102 MMHG | HEART RATE: 72 BPM | HEIGHT: 68 IN | TEMPERATURE: 98 F | RESPIRATION RATE: 20 BRPM

## 2020-10-14 VITALS — WEIGHT: 210.1 LBS | HEIGHT: 68 IN

## 2020-10-14 VITALS
DIASTOLIC BLOOD PRESSURE: 94 MMHG | SYSTOLIC BLOOD PRESSURE: 145 MMHG | OXYGEN SATURATION: 96 % | RESPIRATION RATE: 18 BRPM | HEART RATE: 70 BPM | TEMPERATURE: 98 F

## 2020-10-14 VITALS — HEIGHT: 68 IN | WEIGHT: 210.1 LBS

## 2020-10-14 DIAGNOSIS — Z79.899 OTHER LONG TERM (CURRENT) DRUG THERAPY: ICD-10-CM

## 2020-10-14 DIAGNOSIS — H57.12 OCULAR PAIN, LEFT EYE: ICD-10-CM

## 2020-10-14 DIAGNOSIS — I10 ESSENTIAL (PRIMARY) HYPERTENSION: ICD-10-CM
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
## 2020-10-14 DIAGNOSIS — R20.0 ANESTHESIA OF SKIN: ICD-10-CM

## 2020-10-14 DIAGNOSIS — Z98.890 OTHER SPECIFIED POSTPROCEDURAL STATES: Chronic | ICD-10-CM

## 2020-10-14 DIAGNOSIS — S05.02XA INJURY OF CONJUNCTIVA AND CORNEAL ABRASION WITHOUT FOREIGN BODY, LEFT EYE, INITIAL ENCOUNTER: ICD-10-CM

## 2020-10-14 DIAGNOSIS — Z20.828 CONTACT WITH AND (SUSPECTED) EXPOSURE TO OTHER VIRAL COMMUNICABLE DISEASES: ICD-10-CM

## 2020-10-14 DIAGNOSIS — H05.012 CELLULITIS OF LEFT ORBIT: ICD-10-CM
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
## 2020-10-14 DIAGNOSIS — J32.9 CHRONIC SINUSITIS, UNSPECIFIED: ICD-10-CM

## 2020-10-14 DIAGNOSIS — Z87.81 PERSONAL HISTORY OF (HEALED) TRAUMATIC FRACTURE: ICD-10-CM

## 2020-10-14 LAB
ALBUMIN SERPL ELPH-MCNC: 4.4 G/DL — SIGNIFICANT CHANGE UP (ref 3.3–5)
ALP SERPL-CCNC: 90 U/L — SIGNIFICANT CHANGE UP (ref 40–120)
ALT FLD-CCNC: 85 U/L — HIGH (ref 12–78)
ANION GAP SERPL CALC-SCNC: 6 MMOL/L — SIGNIFICANT CHANGE UP (ref 5–17)
APTT BLD: 36.9 SEC — HIGH (ref 27.5–35.5)
AST SERPL-CCNC: 30 U/L — SIGNIFICANT CHANGE UP (ref 15–37)
BASOPHILS # BLD AUTO: 0.08 K/UL — SIGNIFICANT CHANGE UP (ref 0–0.2)
BASOPHILS NFR BLD AUTO: 0.6 % — SIGNIFICANT CHANGE UP (ref 0–2)
BILIRUB SERPL-MCNC: 0.4 MG/DL — SIGNIFICANT CHANGE UP (ref 0.2–1.2)
BUN SERPL-MCNC: 10 MG/DL — SIGNIFICANT CHANGE UP (ref 7–23)
CALCIUM SERPL-MCNC: 9.7 MG/DL — SIGNIFICANT CHANGE UP (ref 8.5–10.1)
CHLORIDE SERPL-SCNC: 106 MMOL/L — SIGNIFICANT CHANGE UP (ref 96–108)
CO2 SERPL-SCNC: 25 MMOL/L — SIGNIFICANT CHANGE UP (ref 22–31)
CREAT SERPL-MCNC: 0.83 MG/DL — SIGNIFICANT CHANGE UP (ref 0.5–1.3)
EOSINOPHIL # BLD AUTO: 0.1 K/UL — SIGNIFICANT CHANGE UP (ref 0–0.5)
EOSINOPHIL NFR BLD AUTO: 0.7 % — SIGNIFICANT CHANGE UP (ref 0–6)
GLUCOSE SERPL-MCNC: 114 MG/DL — HIGH (ref 70–99)
HCT VFR BLD CALC: 46.1 % — SIGNIFICANT CHANGE UP (ref 39–50)
HGB BLD-MCNC: 15.3 G/DL — SIGNIFICANT CHANGE UP (ref 13–17)
IMM GRANULOCYTES NFR BLD AUTO: 0.4 % — SIGNIFICANT CHANGE UP (ref 0–1.5)
INR BLD: 1.13 RATIO — SIGNIFICANT CHANGE UP (ref 0.88–1.16)
LYMPHOCYTES # BLD AUTO: 2.76 K/UL — SIGNIFICANT CHANGE UP (ref 1–3.3)
LYMPHOCYTES # BLD AUTO: 20.1 % — SIGNIFICANT CHANGE UP (ref 13–44)
MCHC RBC-ENTMCNC: 29.9 PG — SIGNIFICANT CHANGE UP (ref 27–34)
MCHC RBC-ENTMCNC: 33.2 GM/DL — SIGNIFICANT CHANGE UP (ref 32–36)
MCV RBC AUTO: 90.2 FL — SIGNIFICANT CHANGE UP (ref 80–100)
MONOCYTES # BLD AUTO: 1.23 K/UL — HIGH (ref 0–0.9)
MONOCYTES NFR BLD AUTO: 9 % — SIGNIFICANT CHANGE UP (ref 2–14)
NEUTROPHILS # BLD AUTO: 9.51 K/UL — HIGH (ref 1.8–7.4)
NEUTROPHILS NFR BLD AUTO: 69.2 % — SIGNIFICANT CHANGE UP (ref 43–77)
PLATELET # BLD AUTO: 441 K/UL — HIGH (ref 150–400)
POTASSIUM SERPL-MCNC: 4 MMOL/L — SIGNIFICANT CHANGE UP (ref 3.5–5.3)
POTASSIUM SERPL-SCNC: 4 MMOL/L — SIGNIFICANT CHANGE UP (ref 3.5–5.3)
PROT SERPL-MCNC: 9 GM/DL — HIGH (ref 6–8.3)
PROTHROM AB SERPL-ACNC: 13.1 SEC — SIGNIFICANT CHANGE UP (ref 10.6–13.6)
RBC # BLD: 5.11 M/UL — SIGNIFICANT CHANGE UP (ref 4.2–5.8)
RBC # FLD: 11.9 % — SIGNIFICANT CHANGE UP (ref 10.3–14.5)
SARS-COV-2 RNA SPEC QL NAA+PROBE: SIGNIFICANT CHANGE UP
SODIUM SERPL-SCNC: 137 MMOL/L — SIGNIFICANT CHANGE UP (ref 135–145)
WBC # BLD: 13.73 K/UL — HIGH (ref 3.8–10.5)
WBC # FLD AUTO: 13.73 K/UL — HIGH (ref 3.8–10.5)

## 2020-10-14 PROCEDURE — 99218: CPT

## 2020-10-14 PROCEDURE — 70487 CT MAXILLOFACIAL W/DYE: CPT | Mod: 26

## 2020-10-14 PROCEDURE — 96375 TX/PRO/DX INJ NEW DRUG ADDON: CPT

## 2020-10-14 PROCEDURE — 99285 EMERGENCY DEPT VISIT HI MDM: CPT | Mod: 25

## 2020-10-14 PROCEDURE — 99283 EMERGENCY DEPT VISIT LOW MDM: CPT

## 2020-10-14 PROCEDURE — 85610 PROTHROMBIN TIME: CPT

## 2020-10-14 PROCEDURE — 85025 COMPLETE CBC W/AUTO DIFF WBC: CPT

## 2020-10-14 PROCEDURE — 80053 COMPREHEN METABOLIC PANEL: CPT

## 2020-10-14 PROCEDURE — 99053 MED SERV 10PM-8AM 24 HR FAC: CPT

## 2020-10-14 PROCEDURE — 85730 THROMBOPLASTIN TIME PARTIAL: CPT

## 2020-10-14 PROCEDURE — 70487 CT MAXILLOFACIAL W/DYE: CPT

## 2020-10-14 PROCEDURE — 96361 HYDRATE IV INFUSION ADD-ON: CPT

## 2020-10-14 PROCEDURE — 36415 COLL VENOUS BLD VENIPUNCTURE: CPT

## 2020-10-14 PROCEDURE — U0003: CPT

## 2020-10-14 PROCEDURE — 96374 THER/PROPH/DIAG INJ IV PUSH: CPT | Mod: XU

## 2020-10-14 PROCEDURE — 94640 AIRWAY INHALATION TREATMENT: CPT

## 2020-10-14 PROCEDURE — 99285 EMERGENCY DEPT VISIT HI MDM: CPT

## 2020-10-14 RX ORDER — CARVEDILOL PHOSPHATE 80 MG/1
0 CAPSULE, EXTENDED RELEASE ORAL
Qty: 0 | Refills: 0 | DISCHARGE

## 2020-10-14 RX ORDER — DEXAMETHASONE 0.5 MG/5ML
10 ELIXIR ORAL ONCE
Refills: 0 | Status: COMPLETED | OUTPATIENT
Start: 2020-10-14 | End: 2020-10-14

## 2020-10-14 RX ORDER — AMPICILLIN SODIUM AND SULBACTAM SODIUM 250; 125 MG/ML; MG/ML
3 INJECTION, POWDER, FOR SUSPENSION INTRAMUSCULAR; INTRAVENOUS ONCE
Refills: 0 | Status: COMPLETED | OUTPATIENT
Start: 2020-10-14 | End: 2020-10-14

## 2020-10-14 RX ORDER — FLUTICASONE PROPIONATE 50 MCG
1 SPRAY, SUSPENSION NASAL
Refills: 0 | Status: DISCONTINUED | OUTPATIENT
Start: 2020-10-14 | End: 2020-10-18

## 2020-10-14 RX ORDER — AMLODIPINE BESYLATE 2.5 MG/1
5 TABLET ORAL DAILY
Refills: 0 | Status: DISCONTINUED | OUTPATIENT
Start: 2020-10-14 | End: 2020-10-18

## 2020-10-14 RX ORDER — CIPROFLOXACIN HCL 0.3 %
1 DROPS OPHTHALMIC (EYE) ONCE
Refills: 0 | Status: COMPLETED | OUTPATIENT
Start: 2020-10-14 | End: 2020-10-14

## 2020-10-14 RX ORDER — AMPICILLIN SODIUM AND SULBACTAM SODIUM 250; 125 MG/ML; MG/ML
3 INJECTION, POWDER, FOR SUSPENSION INTRAMUSCULAR; INTRAVENOUS EVERY 6 HOURS
Refills: 0 | Status: DISCONTINUED | OUTPATIENT
Start: 2020-10-15 | End: 2020-10-15

## 2020-10-14 RX ORDER — AMPICILLIN SODIUM AND SULBACTAM SODIUM 250; 125 MG/ML; MG/ML
INJECTION, POWDER, FOR SUSPENSION INTRAMUSCULAR; INTRAVENOUS
Refills: 0 | Status: DISCONTINUED | OUTPATIENT
Start: 2020-10-14 | End: 2020-10-15

## 2020-10-14 RX ORDER — FLUORESCEIN SODIUM 9 MG
1 STRIP OPHTHALMIC (EYE) ONCE
Refills: 0 | Status: COMPLETED | OUTPATIENT
Start: 2020-10-14 | End: 2020-10-14

## 2020-10-14 RX ORDER — FLUTICASONE PROPIONATE 50 MCG
1 SPRAY, SUSPENSION NASAL ONCE
Refills: 0 | Status: COMPLETED | OUTPATIENT
Start: 2020-10-14 | End: 2020-10-14

## 2020-10-14 RX ORDER — FLUTICASONE PROPIONATE 50 MCG
2 SPRAY, SUSPENSION NASAL
Qty: 1 | Refills: 0
Start: 2020-10-14 | End: 2020-10-20

## 2020-10-14 RX ORDER — MORPHINE SULFATE 50 MG/1
4 CAPSULE, EXTENDED RELEASE ORAL ONCE
Refills: 0 | Status: DISCONTINUED | OUTPATIENT
Start: 2020-10-14 | End: 2020-10-14

## 2020-10-14 RX ORDER — SODIUM CHLORIDE 9 MG/ML
1000 INJECTION INTRAMUSCULAR; INTRAVENOUS; SUBCUTANEOUS ONCE
Refills: 0 | Status: COMPLETED | OUTPATIENT
Start: 2020-10-14 | End: 2020-10-14

## 2020-10-14 RX ORDER — OXYMETAZOLINE HYDROCHLORIDE 0.5 MG/ML
1 SPRAY NASAL
Refills: 0 | Status: DISCONTINUED | OUTPATIENT
Start: 2020-10-14 | End: 2020-10-18

## 2020-10-14 RX ORDER — AMLODIPINE BESYLATE 2.5 MG/1
0 TABLET ORAL
Qty: 0 | Refills: 0 | DISCHARGE

## 2020-10-14 RX ORDER — OXYCODONE AND ACETAMINOPHEN 5; 325 MG/1; MG/1
1 TABLET ORAL ONCE
Refills: 0 | Status: DISCONTINUED | OUTPATIENT
Start: 2020-10-14 | End: 2020-10-14

## 2020-10-14 RX ADMIN — OXYMETAZOLINE HYDROCHLORIDE 1 SPRAY(S): 0.5 SPRAY NASAL at 23:29

## 2020-10-14 RX ADMIN — AMPICILLIN SODIUM AND SULBACTAM SODIUM 200 GRAM(S): 250; 125 INJECTION, POWDER, FOR SUSPENSION INTRAMUSCULAR; INTRAVENOUS at 15:01

## 2020-10-14 RX ADMIN — SODIUM CHLORIDE 1000 MILLILITER(S): 9 INJECTION INTRAMUSCULAR; INTRAVENOUS; SUBCUTANEOUS at 13:36

## 2020-10-14 RX ADMIN — OXYCODONE AND ACETAMINOPHEN 1 TABLET(S): 5; 325 TABLET ORAL at 16:28

## 2020-10-14 RX ADMIN — Medication 1 DROP(S): at 15:09

## 2020-10-14 RX ADMIN — Medication 1 SPRAY(S): at 03:09

## 2020-10-14 RX ADMIN — MORPHINE SULFATE 4 MILLIGRAM(S): 50 CAPSULE, EXTENDED RELEASE ORAL at 13:36

## 2020-10-14 RX ADMIN — Medication 102 MILLIGRAM(S): at 22:17

## 2020-10-14 RX ADMIN — Medication 1 APPLICATION(S): at 02:25

## 2020-10-14 RX ADMIN — Medication 1 TABLET(S): at 03:07

## 2020-10-14 RX ADMIN — Medication 1 DROP(S): at 02:26

## 2020-10-14 RX ADMIN — AMPICILLIN SODIUM AND SULBACTAM SODIUM 200 GRAM(S): 250; 125 INJECTION, POWDER, FOR SUSPENSION INTRAMUSCULAR; INTRAVENOUS at 23:25

## 2020-10-14 RX ADMIN — SODIUM CHLORIDE 1000 MILLILITER(S): 9 INJECTION INTRAMUSCULAR; INTRAVENOUS; SUBCUTANEOUS at 16:29

## 2020-10-14 NOTE — ED STATDOCS - ATTENDING CONTRIBUTION TO CARE
I, Gretel Bragg DO,  performed the initial face to face bedside interview with this patient regarding history of present illness, review of symptoms and relevant past medical, social and family history.  I completed an independent physical examination.  I was the initial provider who evaluated this patient. I have signed out the follow up of any pending tests (i.e. labs, radiological studies) to the ACP.  I have communicated the patient’s plan of care and disposition with the ACP.  The history, relevant review of systems, past medical and surgical history, medical decision making, and physical examination was documented by the scribe in my presence and I attest to the accuracy of the documentation.

## 2020-10-14 NOTE — CONSULT NOTE ADULT - SUBJECTIVE AND OBJECTIVE BOX
[facility] DEPARTMENT OF OPHTHALMOLOGY - INITIAL ADULT CONSULT  -----------------------------------------------------------------------------------------------------------------  Eric Shen MD PGY 2  Pager: [phone]  -----------------------------------------------------------------------------------------------------------------    HPI: ED HPI   · Chief Complaint: The patient is a 25y Male complaining of  · HPI Objective Statement: pt transferred from [place] for ENT and ophtho eval for findings are suspicious for retrobulbar post septal cellulitis without loculated fluid collection to suggest subperiosteal abscess. ENT correlation is recommended. Pt complaining of pain with eye movement. no fevers, no chills  	26 y/o male with a PMHx of HTN on Amlodipine, left orbital floor fx s/p repair in 2017, presents to the ED c/o left facial pain and swelling since yesterday at 2pm. Pt reports bleeding from left nare last night. States he gets this every single year at about the same time. Typically improves with Amoxicillin or Augmentin. Pt was seen in ED last night for left eye pain and was d/c on Augmentin. Took 2 doses already. Pt presents to ED again for worsening pain. Wears glasses. No other complaints at this time. [facility]  	visual acuity in [place] normal  and mildly elevated tonometry 23 . pt given unasyn in [place]     	Compared to previous examination, there is worsening of the mixed density opacity in the left maxillary sinus which extends above the fracture line of the left orbital floor and causes extraconal fat stranding and stranding along the belly of the inferior rectus muscle. These findings are suspicious for retrobulbar post septal cellulitis without loculated fluid collection to suggest subperiosteal abscess. ENT correlation is recommended.  Patient reports having Double vision in the AM that has since improved.    Pt. Denies decreased or blurry vision, Eye irritation , flashes or floater    PAST MEDICAL & SURGICAL HISTORY:  HTN (hypertension)    No pertinent past medical history    H/O eye surgery      Past Ocular History: Left Orbital Floor Fracture s/p repair with mesh 2017    Family History:  FAMILY HISTORY:  No pertinent family history in first degree relatives    Ophthalmic Medications: N/A    MEDICATIONS  (STANDING):  amLODIPine   Tablet 5 milliGRAM(s) Oral daily  ampicillin/sulbactam  IVPB      fluticasone propionate 50 MICROgram(s)/spray Nasal Spray 1 Spray(s) Both Nostrils two times a day  oxymetazoline 0.05% Nasal Spray 1 Spray(s) Both Nostrils two times a day    MEDICATIONS  (PRN):    Allergies & Intolerances:   patient instructed he can not take anticoagulant medications due to risk in bleeding to left eye (Unknown)    Review of Systems:  Constitutional: No fever, chills  Eyes: As Above  Neuro: No tremors  Cardiovascular: No chest pain, palpitations  Respiratory: No SOB, no cough  GI: No nausea, vomiting, abdominal pain    VITALS: T(C): 36.8 (10-14-20 @ 17:37)  T(F): 98.2 (10-14-20 @ 17:37), Max: 98.5 (10-14-20 @ 01:56)  HR: 72 (10-14-20 @ 17:37) (70 - 91)  BP: 150/102 (10-14-20 @ 17:37) (138/106 - 150/102)  RR:  (18 - 20)  SpO2:  (96% - 100%)  Wt(kg): --  General: AAO x 3, appropriate mood and affect    Ophthalmology Exam:  Visual acuity (cc Near) 20/20 OU.  Pupils: PERRL OU, no APD  Tonometry: 10/12  Extraocular movements (EOMs): Slight Restriction UP gaze OU and "tightness"(not pain as per patient, and possible cause for decreased upgaze movement) OS on upgaze with Left Hypertropia, no diplopia.  Confrontational Visual Field (CVF): Full OU.  Color Plates: 12/12 OU.    Pen Light Exam (PLE)  External: OD: Normal OS: Tenderness and swelling over lower lid and Maxillary bone, With very slight restriction to Retropulsion compared to OD, No proptosis appreciated.  Lids/Lashes/Lacrimal Ducts: Flat OU.  Sclera/Conjunctiva: OD - White and quiet Os- Trace Injection   Cornea: Clear OU.  Anterior Chamber: Deep and formed OU.    Iris: Flat OU.  Lens: Clear OU.    Fundus Exam: dilated with 1% tropicamide and 2.5% phenylephrine  Approval obtained from primary team for dilation  Patient aware that pupils can remained dilated for at least 4-6 hours.  Exam performed with 20 D lens    Vitreous: wnl OU  Disc, cup/disc: sharp and pink, 0.4 OU  Macula: wnl OU  Vessels: wnl OU  Periphery: OD-wnl OS Temporal CRS with PIgment    Labs/Imaging:                        15.3   13.73 )-----------( 441      ( 14 Oct 2020 13:21 )             46.1   10-14    137  |  106  |  10  ----------------------------<  114<H>  4.0   |  25  |  0.83    Ca    9.7      14 Oct 2020 13:21    TPro  9.0<H>  /  Alb  4.4  /  TBili  0.4  /  DBili  x   /  AST  30  /  ALT  85<H>  /  AlkPhos  90  10-14    < from: CT Maxillofacial w/ IV Cont (10.14.20 @ 14:07) >  Findings:  Again seen is the left inferior orbital fracture and fracture of the left lamina papyracea. There is almost complete opacification of the left maxillary sinus which has mildly progressed since prior examination and is similar to the study of 1/15/2018. The opacification is of mixed density and is above the fracture line of the left orbital floor. There is stranding of the extraconal fat of the retrobulbar compartment of the left orbit with mild left premalar/infraorbital stranding. There is also stranding along the midportion of the belly of the inferior rectus.    The visualized globes are symmetric bilaterally and the lenses are normally situated. There is no evidence of orbital subperiosteal abscess. The extraocular muscles and optic nerve sheaths are normal bilaterally.      The soft tissues at the skull base and intracranially are within limits of normal.    The visualized suprahyoid compartment including the  space,  fat, lateral parapharyngeal spaces are intact. Again seen is a soft tissue density adjacentto the hyoid bone which is stable since prior examination and may represent the remnant pyramidal lobe or a thyroglossal duct cyst.    The sublingual glands and parotid glands enhance uniformly. There is no enlarged lymphadenopathy in the visualized portion of the neck.      IMPRESSION:    Compared to previous examination, there is worsening of the mixed density opacity in the left maxillary sinus which extends above the fracture line of the left orbital floor and causes extraconal fat stranding and stranding along the belly of the inferior rectus muscle. These findings are suspicious for retrobulbar post septal cellulitis without loculated fluid collection to suggest subperiosteal abscess. ENT correlation is recommended.    There is also left premalar soft tissue stranding.    < end of copied text >

## 2020-10-14 NOTE — CONSULT NOTE ADULT - SUBJECTIVE AND OBJECTIVE BOX
HPI:  Patient is a 25y Male with PMH significant for left orbital floor fx s/p mesh repair in 2017 who p/w left eye swelling and pain for 1 days. Patient initially presented to outside hosptial and CT scan suspicious for retrobulbar post septal cellulitis. Therefore patient was transferred to [facility] for ENT and Ophtho eval. Patient states this happens about 1x per year and usually improves with Augmentin. He went to the ED last night and was discharged with Augmentin but he represented today with worsening pain and blurry vision that has now resolved. Denies fevers chills. + facial pressure/pain on left. No discharge or drainage from nose. + Nasal congestion, no changes to sense of smell. No recent illness, no sick contacts.     Afebrile  WBC 13.7    Physical Exam  T(C): 36.8 (10-14-20 @ 17:37), Max: 36.9 (10-14-20 @ 01:56)  HR: 72 (10-14-20 @ 17:37) (70 - 91)  BP: 150/102 (10-14-20 @ 17:37) (138/106 - 150/102)  RR: 20 (10-14-20 @ 17:37) (18 - 20)  SpO2: 100% (10-14-20 @ 17:37) (96% - 100%)  General: NAD, A+Ox3  No respiratory distress, stridor, or stertor  Voice quality: normal  Face:  Symmetric without masses or lesions  OU: PERRL, EOMI, pain with left lateral gaze, left eye with mild periorbital edema, eye open at rest, no injection   AD: Pinna wnl, EAC with soft cerumen with partial occlusion of TM, TM otherwise intact, no effusion  AS: Pinna wnl, EAC clear, TM intact, no effusion  Nose: nasal cavity clear bilaterally, inferior turbinates normal, mucosa normal without crusting or bleeding  OC/OP: tongue normal, floor of mouth wnl, no masses or lesions, OP clear  Neck: soft/flat, no LAD  CNII-XII intact    Procedure:  Nasal endoscopy    Scope passed in left and right naris. No purulence noted for OMC bilaterally or SE recess. Mucosa pink, moist, healthy appearing       A/P: 25 M with hx of L orbital floor fracture with left eye pain and swelling with imaging c/w retrobulbar cellulitis with L maxillary sinus opacification.  - No acute surgical intervention  - IV abx****  - Ophtho consult  - Afrin (Oxymetasoline) 2 sprays BID to bilateral nares before first and last rinse of the day x 3 days then stop  - Flonase 2 sprays BID to bilateral nares afer first and last rinse of the day  - Sinus rinse q4h: NeilMed sinus rinse kit OR  60 cc Jaic syringe- fill syringe with 60cc of noraml saline and rinse 30cc through eacn nare   - Will discuss with attending, Dr. Keith and update ED with changes to plan HPI:  Patient is a 25y Male with PMH significant for left orbital floor fx s/p mesh repair in 2017 who p/w left eye swelling and pain for 1 days. Patient initially presented to outside hosptial and CT scan suspicious for retrobulbar post septal cellulitis. Therefore patient was transferred to [facility] for ENT and Ophtho eval. Patient states this happens about 1x per year and usually improves with Augmentin. He went to the ED last night and was discharged with Augmentin but he represented today with worsening pain and blurry vision that has now resolved. Denies fevers chills. + facial pressure/pain on left. No discharge or drainage from nose. + Nasal congestion, no changes to sense of smell. No recent illness, no sick contacts.     Afebrile  WBC 13.7    Physical Exam  T(C): 36.8 (10-14-20 @ 17:37), Max: 36.9 (10-14-20 @ 01:56)  HR: 72 (10-14-20 @ 17:37) (70 - 91)  BP: 150/102 (10-14-20 @ 17:37) (138/106 - 150/102)  RR: 20 (10-14-20 @ 17:37) (18 - 20)  SpO2: 100% (10-14-20 @ 17:37) (96% - 100%)  General: NAD, A+Ox3  No respiratory distress, stridor, or stertor  Voice quality: normal  Face:  Symmetric without masses or lesions  OU: PERRL, EOMI, pain with left lateral gaze, left eye with mild periorbital edema, eye open at rest, no injection   AD: Pinna wnl, EAC with soft cerumen with partial occlusion of TM, TM otherwise intact, no effusion  AS: Pinna wnl, EAC clear, TM intact, no effusion  Nose: nasal cavity clear bilaterally, inferior turbinates normal, mucosa normal without crusting or bleeding  OC/OP: tongue normal, floor of mouth wnl, no masses or lesions, OP clear  Neck: soft/flat, no LAD  CNII-XII intact    Procedure:  Nasal endoscopy    Scope passed in left and right naris. No purulence noted for OMC bilaterally or SE recess. Mucosa pink, moist, healthy appearing       A/P: 25 M with hx of L orbital floor fracture with left eye pain and swelling with imaging c/w retrobulbar cellulitis with L maxillary sinus opacification.  - No acute surgical intervention  - Admit to CDU  - IV Unasyn   - 10mg decadron x 1  - Ophtho consult  - Afrin (Oxymetasoline) 2 sprays BID to bilateral nares before first and last rinse of the day x 3 days then stop  - Flonase 2 sprays BID to bilateral nares afer first and last rinse of the day  - Sinus rinse q4h: NeilMed sinus rinse kit OR  60 cc Jaci syringe- fill syringe with 60cc of noraml saline and rinse 30cc through eacn nare   - Discussed with Dr. Keith and update ED with changes to plan HPI:  Patient is a 25y Male with PMH significant for left orbital floor fx s/p mesh repair in 2017 who p/w left eye swelling and pain for 1 days. Patient initially presented to outside hosptial and CT scan suspicious for retrobulbar post septal cellulitis. Therefore patient was transferred to [facility] for ENT and Ophtho eval. Patient states this happens about 1x per year and usually improves with Augmentin. He went to the ED last night and was discharged with Augmentin but he represented today with worsening pain and blurry vision that has now resolved. Denies fevers chills. + facial pressure/pain on left. No discharge or drainage from nose. + Nasal congestion, no changes to sense of smell. No recent illness, no sick contacts.     Afebrile  WBC 13.7    Physical Exam  T(C): 36.8 (10-14-20 @ 17:37), Max: 36.9 (10-14-20 @ 01:56)  HR: 72 (10-14-20 @ 17:37) (70 - 91)  BP: 150/102 (10-14-20 @ 17:37) (138/106 - 150/102)  RR: 20 (10-14-20 @ 17:37) (18 - 20)  SpO2: 100% (10-14-20 @ 17:37) (96% - 100%)  General: NAD, A+Ox3  No respiratory distress, stridor, or stertor  Voice quality: normal  Face:  Symmetric without masses or lesions  OU: PERRL, EOMI, pain with left lateral gaze, left eye with mild periorbital edema, eye open at rest, no injection   AD: Pinna wnl, EAC with soft cerumen with partial occlusion of TM, TM otherwise intact, no effusion  AS: Pinna wnl, EAC clear, TM intact, no effusion  Nose: nasal cavity clear bilaterally, inferior turbinates normal, mucosa normal without crusting or bleeding  OC/OP: tongue normal, floor of mouth wnl, no masses or lesions, OP clear  Neck: soft/flat, no LAD  CNII-XII intact    Procedure:  Nasal endoscopy    Scope passed in left and right naris. No purulence noted for OMC bilaterally or SE recess. Mucosa pink, moist, healthy appearing       A/P: 25 M with hx of L orbital floor fracture with left eye pain and swelling with imaging c/w retrobulbar cellulitis with L maxillary sinus opacification.  - No acute surgical intervention  - Admit to CDU  - IV clinda 900 q8  - 10mg decadron x 1  - Ophtho consult  - Afrin (Oxymetasoline) 2 sprays BID to bilateral nares before first and last rinse of the day x 3 days then stop  - Flonase 2 sprays BID to bilateral nares afer first and last rinse of the day  - Sinus rinse q4h: NeilMed sinus rinse kit OR  60 cc Jaci syringe- fill syringe with 60cc of noraml saline and rinse 30cc through eacn nare   - Discussed with Dr. Keith and update ED with changes to plan

## 2020-10-14 NOTE — CONSULT NOTE ADULT - NOSE
nasal/sinus endoscopy: maxillary sinus with mucopurulent fluid on left via inferior meatus, right maxillary sinus with mucoid fluid on right/inflamed mucosa/congestion/clear discharge/purulent discharge

## 2020-10-14 NOTE — ED PROVIDER NOTE - PHYSICAL EXAMINATION
Constitutional: NAD, well appearing  HEENT: No blood to nares, TTP to left maxilla/inferior orbit, no proptosis, EOMI without pain. No scleral injection.  No erythema to orbit.  No uptake on fluorescein staining.  Tonopressure 17 on left.  Gross vision intact.    CVS:  RRR, no m/r/g  Resp:  CTAB  GI: soft, ntnd  MSK:  no restriction to rom, full ROM to all extremities  Neuro:  A&Ox3  Skin: no rash  psych: clear thought content  Heme/lymph:  No LAD

## 2020-10-14 NOTE — CONSULT NOTE ADULT - ASSESSMENT
Assessment and Recommendations:  25y male with a past medical history/ocular history of Left Orbital Floor Fracture consulted for Orbital Cellulitis, found to have Orbital Cellulitis secondary to Maxillary Sinus Infection spreading through previous Orbital Floor Fracture.    # Orbital Cellulitis - Left eye  - Extension of Mixed Density opacity from Maxillary sinus to Left Orbital floor with Evidence of Orbital Cellulitis on Imaging  - Slight resistance to Retropulsion and EOM essentially full with slight Restriction upgaze OU possibly due to discomfort, No Proptosis  - Globe intact with no evidence of Compartment Syndrome   - Dilated Exam and Anterior pen light exam appears normal with no signs of Intraocular abnormalities.   - No Acute Ophthalmological intervention  - Continue Treatment management as per ENT  - Will follow    # Left Orbital Floor Fracture  - Chronic Floor fracture with history of Repair with mesh  - Patient reports multiple episode of SInus infections treated with Oral Antibiotics  - Current infection appears to have spread through old fracture  - Plastics Consult recommended to evaluate for any possible intervention/follow up regarding this Fracture/Repair History    SILVIA Roblero PGY4  DW Dr. Anton - Oculoplastics attending     Outpatient Follow-up: Patient should follow-up with his/her ophthalmologist or with [facility] Department of Ophthalmology within 1 week of after discharge at:    [address]  [place] [address]  [phone]    Eric Shen MD, PGY-2  Pager: [phone]  Also available on Microsoft Teams
Assessment:  The patient is a 25y Male with a past medical history significant for left orbital floor fx s/p mesh repair in 2017 who presents to the emergency room at [facility] with a chief complaint of left eye swelling and pain for 1 days.  Ddx: acute pharyngitis and left acute maxillary sinusitis complicated by left preseptal cellulitis.    Plan:  1) afrin nasal spray BID x 3 days  2) saline nasal spray BID x 3 weeks  3) unasyn  4) decadron 10mg IV x 1 now

## 2020-10-14 NOTE — ED STATDOCS - CLINICAL SUMMARY MEDICAL DECISION MAKING FREE TEXT BOX
26 y/o male hx of left orbital floor fx with left sided facial pain. Plan: CT, pain control, labs, reeval.

## 2020-10-14 NOTE — ED ADULT NURSE NOTE - OBJECTIVE STATEMENT
Pt states he has had increasing pain and swelling to left eye area. Pt states occ blurry or double vision. Pt with hx of surgery to that eye with mesh placement.

## 2020-10-14 NOTE — ED PROVIDER NOTE - OBJECTIVE STATEMENT
pt transferred from [place] for ENT and ophtho eval for findings are suspicious for retrobulbar post septal cellulitis without loculated fluid collection to suggest subperiosteal abscess. ENT correlation is recommended. Pt complaining of pain with eye movement. no fevers, no chills  26 y/o male with a PMHx of HTN on Amlodipine, left orbital floor fx s/p repair in 2017, presents to the ED c/o left facial pain and swelling since yesterday at 2pm. Pt reports bleeding from left nare last night. States he gets this every single year at about the same time. Typically improves with Amoxicillin or Augmentin. Pt was seen in ED last night for left eye pain and was d/c on Augmentin. Took 2 doses already. Pt presents to ED again for worsening pain. Wears glasses. No other complaints at this time. [facility]  [facility] acuity in [place] normal  and mildly elevated tonometry 23     Compared to previous examination, there is worsening of the mixed density opacity in the left maxillary sinus which extends above the fracture line of the left orbital floor and causes extraconal fat stranding and stranding along the belly of the inferior rectus muscle. These findings are suspicious for retrobulbar post septal cellulitis without loculated fluid collection to suggest subperiosteal abscess. ENT correlation is recommended. pt transferred from [place] for ENT and ophtho eval for findings are suspicious for retrobulbar post septal cellulitis without loculated fluid collection to suggest subperiosteal abscess. ENT correlation is recommended. Pt complaining of pain with eye movement. no fevers, no chills  24 y/o male with a PMHx of HTN on Amlodipine, left orbital floor fx s/p repair in 2017, presents to the ED c/o left facial pain and swelling since yesterday at 2pm. Pt reports bleeding from left nare last night. States he gets this every single year at about the same time. Typically improves with Amoxicillin or Augmentin. Pt was seen in ED last night for left eye pain and was d/c on Augmentin. Took 2 doses already. Pt presents to ED again for worsening pain. Wears glasses. No other complaints at this time. PMD: [facility]  visual acuity in [place] normal  and mildly elevated tonometry 23 . pt given unasyn in [place]     Compared to previous examination, there is worsening of the mixed density opacity in the left maxillary sinus which extends above the fracture line of the left orbital floor and causes extraconal fat stranding and stranding along the belly of the inferior rectus muscle. These findings are suspicious for retrobulbar post septal cellulitis without loculated fluid collection to suggest subperiosteal abscess. ENT correlation is recommended.

## 2020-10-14 NOTE — ED ADULT TRIAGE NOTE - CHIEF COMPLAINT QUOTE
sharp pain to left eye with mild swelling and tearing.  worsening since this afternoon. pt denies trauma

## 2020-10-14 NOTE — ED ADULT NURSE NOTE - CHPI ED NUR SYMPTOMS NEG
no loss of consciousness/no bleeding gums/no fever/no nausea/no numbness/no syncope/no weakness/no vomiting

## 2020-10-14 NOTE — ED PROVIDER NOTE - PHYSICAL EXAMINATION
Gen:  Well appearning in NAD  Head:  NC/AT  Resp: No distress   Ext: no deformities  Skin: warm and dry as visualized  PERRL, pain with EOM, limited EOM to left and upwards  + facial swelling on left side

## 2020-10-14 NOTE — ED STATDOCS - PROGRESS NOTE DETAILS
PI: 23yo M with history of L orbital floor fracture 2/2 heavy object falling onto L eye, s/p repair by plastic surgery in [place] in 7/2017 in 2018 CT maxillofacial at [place] showed hemorrhage in inferior aspect of L orbit with extension into L maxillary sinus and L nasal cavity. The hemorrhage also has mass effect on L inferior rectus muscle. Pt has mild "straining" pain with EOM on left lateral gaze. No changes in vision or any other complaints    Pt. evaluated last night for swelling to left cheek and blurry vision, mild epistaxis.  DC with Augmentin and Flonase. Increased bleeding overnight with tearing to left eye and blurry vision.  Pt. feeling "tightness" with upward gaze and lateral gaze.  FLuroceine showing central vertical  linear corneal abrasion left eye, [facility] by [facility] chart with corrective lenses. 20/25 OD  20/25 OS  20/25 OU.  Pressure OS 23  OD 15.  Will obtain labs, CT maxillofacial.  Geovanna Epstein PA-C PI: 21yo M with history of L orbital floor fracture 2/2 heavy object falling onto L eye, s/p repair by plastic surgery in [place] in 7/2017 in 2018 CT maxillofacial at [place] showed hemorrhage in inferior aspect of L orbit with extension into L maxillary sinus and L nasal cavity. The hemorrhage also has mass effect on L inferior rectus muscle. Pt has mild "straining" pain with EOM on left lateral gaze. No changes in vision or any other complaints    Pt. evaluated last night for swelling to left cheek and blurry vision, mild epistaxis.  DC with Augmentin and Flonase. Increased bleeding overnight with tearing to left eye and blurry vision..  Pt. also feeling numbness to left upper lip.    Pt. feeling "tightness" with upward gaze and lateral gaze.  FLuroceine showing central vertical  linear corneal abrasion left eye, [facility] by eye chart with corrective lenses. 20/25 OD  20/25 OS  20/25 OU.  Pressure OS 23  OD 15.  Will obtain labs, CT maxillofacial.  Geovanna Epstein PA-C I spoke with Chris VIVEROS ENT at [facility] and he requested ED transfer and to speak with attending.  ENT to consult.  Ophthamology to consult. ED transfer to [facility] for ENT/Ophthamology consult.  Geovanna Epstein PA-C

## 2020-10-14 NOTE — ED CLERICAL - NS ED CLERK NOTE PRE-ARRIVAL INFORMATION; ADDITIONAL PRE-ARRIVAL INFORMATION
Transfer center call in.  Accepting MD Dr. ng.  Pt being transferred for left eye and check swelling. Hx of l orbital fx and repair 2017. Had CT scan noted with fluid left eye. Pt given unasyn 3gm IVPB.  one liter normal saline, cipro eye drops. COVID WAS sent.

## 2020-10-14 NOTE — CHART NOTE - NSCHARTNOTEFT_GEN_A_CORE
[facility]  Head & Neck Surgery Procedure Note    Name:                  Silas Bowers	               Surgeon:   Lucio Keith MD	  				  Date of Procedure: 10/14/2020                          Assistant:  None    Record Number:	[id number]                              Anesthesia:  Topical Anesthesia     Preoperative diagnosis:	Acute maxillary sinusitis, unspecified (J01.00);  Acute Pansinusitis (J01.40)    Postoperative diagnosis:	Same    Procedure:	              Bilateral maxillary sinus endoscopy  (61589)    INDICATION:  The patient is a 25y Male with a past medical history significant for left orbital floor fx s/p mesh repair in 2017 who presents to the emergency room at [facility] with a chief complaint of left eye swelling and pain for 1 days. Patient initially presented to outside hosptial and CT scan suspicious for retrobulbar post septal cellulitis. Therefore patient was transferred to [facility] for ENT and Ophtho eval. Patient states this happens about 1x per year and usually improves with Augmentin. He went to the ED last night and was discharged with Augmentin but he represented today with worsening pain and blurry vision that has now resolved. Denies fevers chills. + facial pressure/pain on left. No discharge or drainage from nose. + Nasal congestion, no changes to sense of smell. No recent illness, no sick contacts.    PROCEDURE:  The patient was seen and his nasal cavities were prepped and sprayed with topical neosynephrine anesthesia.   Following this, a zero degree flexible endoscope was passed into the left nasal vault, and passed posteriorly to the nasopharynx.  There was no evidence of any blood emanating from the left posterior superior lateral nasal wall. The scope was then slowly withdrawn and then rotated superiorly to visualize the middle turbinate and the inferior meatus and osteomeatal complex. The OMC on the left side appeared patent with no evidence of pus or obstruction.  The Maxillary sinus on the left side was then accessed through the inferior meatus.  The maxillary sinus had mild to moderate amount of mucoserous fluid within it without any evidence of masses or lesions.  The frontal duct was then inspected and appeared clear without any mucoid material flowing from it.  The Septum appeared straight.  The scope was then slowly withdrawn.  The zero degree endoscope was then introduced into the right nasal cavity and passed posteriorly to the nasopharynx. There were no masses visible.  There was no evidence of any bleeding emanating from the right posterior septum.  The endoscope was then rotated superiorly to visualize the middle turbinate and the inferior meatus and osteomeatal complex. The Maxillary sinus on the right side was then accessed via the inferior meatus.  There was a minimal amount of mucoserous fluid within the maxillary sinus with no evidence of any masses or pus.  Again the septum appeared to be straight.  The scope was then withdrawn.  The patient tolerated the procedure well.

## 2020-10-14 NOTE — ED PROVIDER NOTE - CLINICAL SUMMARY MEDICAL DECISION MAKING FREE TEXT BOX
Consider periorbital cellulitis vs sinusitis vs trigeminal neuralgia.  Exam not c/w AACG.  No suggestion of orbital cellulitis given exam. Given prior history of surgery to area discussed CT imaging for eval of abscess or bleeding, although no trauma - this is likely low risk given no significant exam findings and presentation is similar to multiple prior presentations which improved with augmentin.  Declines CT and understands risks.  Will trial augmentin.  Understands indications to return.  Understands need for f/u.   D/c home with strict return precautions and prompt outpatient f/u.

## 2020-10-14 NOTE — ED ADULT NURSE NOTE - FINAL NURSING ELECTRONIC SIGNATURE
[email or web address]    Myopia (Nearsightedness)    Nearsightedness, or myopia, as it is medically termed, is a vision condition in which close objects are seen clearly, but objects farther away appear blurred. Nearsightedness occurs if the eyeball is too long or the cornea, the clear front cover of the eye, has too much curvature. As a result, the light entering the eye isnt focused correctly and distant objects look blurred.    Generally, nearsightedness first occurs in school-age children. Because the eye continues to grow during childhood, it typically progresses until about age 20. However, nearsightedness may also develop in adults due to visual stress or health conditions such as diabetes.    A common sign of nearsightedness is difficulty with the clarity of distant objects like a movie or TV screen or the chalkboard in school. A comprehensive optometric examination will include testing for nearsightedness. An optometrist can prescribe eyeglasses or contact lenses that correct nearsightedness by bending the visual images that enter the eyes, focusing the images correctly at the back of the eye. Depending on the amount of nearsightedness, you may only need to wear glasses or contact lenses for certain activities, like watching a movie or driving a car. Or, if you are very nearsighted, they may need to be worn all the time.    What causes nearsightedness?    If one or both parents are nearsighted, there is an increased chance their children will be nearsighted.   The exact cause of nearsightedness is unknown, but two factors may be primarily responsible for its development:  heredity   visual stress  There is significant evidence that many people inherit nearsightedness, or at least the tendency to develop nearsightedness. If one or both parents are nearsighted, there is an increased chance their children will be nearsighted.    Even though the tendency to develop nearsightedness may be inherited, its actual  development may be affected by how a person uses his or her eyes. Individuals who spend considerable time reading, working at a computer, or doing other intense close visual work may be more likely to develop nearsightedness.    Nearsightedness may also occur due to environmental factors or other health problems:  Some people may experience blurred distance vision only at night. This night myopia may be due to the low level of light making it difficult for the eyes to focus properly or the increased pupil size during dark conditions, allowing more peripheral, unfocused light rays to enter the eye.   People who do an excessive amount of near vision work may experience a false or pseudo myopia. Their blurred distance vision is caused by over use of the eyes focusing mechanism. After long periods of near work, their eyes are unable to refocus to see clearly in the distance. The symptoms are usually temporary and clear distance vision may return after resting the eyes. However, over time constant visual stress may lead to a permanent reduction in distance vision.   Symptoms of nearsightedness may also be a sign of variations in blood sugar levels in persons with diabetes or an early indication of a developing cataract.  An optometrist can evaluate vision and determine the cause of the vision problems.      Courtesy of the American [employer] Association      Why Myopia Progression Is a Concern    By Farhad Patel, OD    Are your child's eyes getting worse year after year?  Some children who develop myopia (nearsightedness) have a continual progression of their myopia throughout the school years, including high school. And while the cost of annual eye exams and new glasses every year can be a financial strain for some families, the long-term risks associated with myopia progression can be even greater.    More Children Are Becoming Nearsighted  Myopia is one of the most common eye disorders in the world. The  prevalence of myopia is about 30 to 40 percent among adults in [place] and the [place], and up to 80 percent or higher in the  population, especially in [place].  And the incidence and prevalence of myopia are increasing. For example, in the early 1970s, only about 25 percent of Americans were nearsighted. But by 2004, myopia prevalence in the [place] had grown to nearly 42 percent of the population.    Classification of Myopia Severity  Myopia -- like all refractive errors -- is measured in diopters (D), which are the same units used to measure the optical power of eyeglasses and contact lenses.  Lens carvajal that correct myopia are preceded by a minus sign (-), and are usually measured in 0.25 D increments.  The severity of nearsightedness is often categorized like this:  Mild myopia: -0.25 to -3.00 D   Moderate myopia: -3.25 to -6.00 D   High myopia: greater than -6.00 D  Mild myopia typically does not increase a person's risk for eye health problems. But moderate and high myopia sometimes are associated with serious, vision-threatening side effects. When this occurs in cases of high or very high myopia, the term degenerative myopia or pathological myopia sometimes is used.  People who end up having high myopia as adults usually start getting nearsighted when they are young children, and their myopia progresses year after year.    Myopia progression: When your child wears glasses to see the board in class and needs stronger glasses year after year.      Children who love to read may be at greater risk for myopia progression.   Myopia-Related Eye Problems  Here is a brief summary of significant eye problems that sometimes are associated with nearsightedness, particularly high myopia:  Myopia and cataracts. In a study published in 2011 of cataracts and cataract surgery outcomes among Koreans with high myopia, researchers found cataracts tended to develop sooner in highly myopic eyes compared with  normal eyes. And eyes with high myopia had a higher prevalence of coexisting disease and complications, such as retinal detachment.    Also, visual outcomes following cataract surgery were not as good among highly nearsighted eyes.    In an [place] study of more than 3,600 adults ages 49 to 97, the odds of having cataracts increased significantly with greater amounts of myopia.    Plus, the odds of having a particular type of cataract was twice as high among subjects with high myopia compared with those with low myopia.   Myopia and glaucoma. Myopia -- even mild and moderate myopia -- has been associated with an increased prevalence of glaucoma. In the same [place] study mentioned above, glaucoma was found in 4.2 percent of eyes with mild myopia and 4.4 percent of eyes with moderate-to-high myopia, compared with 1.5 percent of eyes without myopia.    The study authors concluded there is a strong relationship between myopia and glaucoma, and that nearsighted participants in the study had a two to three times greater risk of glaucoma than participants with no myopia.    Also, in a [place] study published in 2007, glaucoma was significantly associated with the severity of myopia. Among adults age 40 or older, those with high myopia had more than twice the odds of having glaucoma as study participants with moderate myopia, and more than three times the odds of individuals with mild myopia.    Compared with participants who either had no myopia or were farsighted, those with high myopia had a 4.2 to 7.6 times greater odds of having glaucoma.        Myopia and retinal detachment. In a study published in American Journal of Epidemiology, researchers found myopia was a clear risk factor for retinal detachment.    Results showed eyes with mild myopia had a four-fold increased risk of retinal detachment compared with non-myopic eyes. Among eyes with moderate and high myopia, the risk increased 10-fold.    The study  authors also concluded that almost 55 percent of retinal detachments not caused by trauma are attributable to myopia.    In the [place] study mentioned above, among participants with high myopia due to elongated eye shape (axial myopia), the incidence of retinal detachment after cataract surgery was 1.72 percent, compared with 0.28 percent among participants with normal eye shape.    In a study conducted in the [place] of the incidence of retinal detachment after cataract surgery, 2.4 percent of highly myopic eyes developed a detached retina within seven years following cataract extraction, compared with an incidence of 0.5 to 1 percent among eyes of any refractive error that underwent cataract surgery.     Myopia and refractive surgery. Also, many people with high myopia are not well-suited for LASIK or other laser refractive surgery. (Highly myopic individuals may still be good candidates for phakic IOL implantation or other vision correction procedures, however.)    What You Can Do About Myopia Progression  The best thing you can do to help slow the progression of your child's myopia is to schedule annual eye exams so your eye doctor can monitor how much and how fast his or her eyes are changing.  Often, children with myopia don't complain about their vision, so be sure to schedule annual exams even if they say their vision seems fine.  If your child's eyes are changing rapidly or regularly, ask your eye doctor about  myopia control measures to slow the progression of nearsightedness.       About the Author: Farhad Patel OD, is  of [email or web address]. Dr. Patel has more than 25 years of experience as an eye care provider, health educator and consultant to the eyewear industry. His special interests include contact lenses, nutrition and preventive vision care. Connect with Dr. Patel via [employer].     15-Oct-2020 02:17

## 2020-10-14 NOTE — ED PROVIDER NOTE - NS ED ROS FT
Constitutional: nad, well appearing  HEENT:  no nasal congestion, eye drainage or ear pain. + left infraorbital pain  CVS:  no cp  Resp:  No sob, no cough  GI:  no abdominal pain, no nausea or vomiting  :  no dysuria  MSK: no joint pain or limited ROM  Skin: no rash  Neuro: no change in mental status or level of consciousness  Heme/lymph: no bleeding

## 2020-10-14 NOTE — ED PROVIDER NOTE - PATIENT PORTAL LINK FT
You can access the FollowMyHealth Patient Portal offered by [facility] by registering at the following website: [email or web address]. By joining [facility]’s FollowMyHealth portal, you will also be able to view your health information using other applications (apps) compatible with our system.

## 2020-10-14 NOTE — CHART NOTE - NSCHARTNOTEFT_GEN_A_CORE
[facility]  Head & Neck Surgery Procedure Note      Name:                  Silas Bowers	               Surgeon:   Lucio Keith MD	  				  Date of Procedure: 10/14/2020                          Assistant:  None    Record Number:	[id number]                              Anesthesia:  Topical Anesthesia       Preoperative diagnosis:	Dysphagia, unspecified (R13.10)  	  Postoperative diagnosis:	Dysphagia, unspecified (R13.10)    Procedure:		Flexible Fiberoptic Laryngoscopy  ([id number])    INDICATION:  The patient is a 25y Male with a past medical history significant for left orbital floor fx s/p mesh repair in 2017 who presents to the emergency room at [facility] with a chief complaint of left eye swelling and pain for 1 days. Patient initially presented to outside hosptial and CT scan suspicious for retrobulbar post septal cellulitis. Therefore patient was transferred to [facility] for ENT and Ophtho eval. Patient states this happens about 1x per year and usually improves with Augmentin. He went to the ED last night and was discharged with Augmentin but he represented today with worsening pain and blurry vision that has now resolved. Denies fevers chills. + facial pressure/pain on left. No discharge or drainage from nose. + Nasal congestion, no changes to sense of smell. No recent illness, no sick contacts.     PROCEDURE: The patient was seen and his bilateral nasal cavities were prepped and sprayed with topical anesthesia of neosynephrine.   Following this, a fiberoptic flexible laryngoscope was passed into the left nasal cavity, and then slowly and meticulously moved posteriorly to the nasopharynx.  There was no evidence of clotted blood within the left anterior and posterior superior lateral nasal wall. There was clear mucous within the nasal cavity.  Once at nasopharynx the skull base and lateral walls of the eustachian tubes were examined for lesions and masses.  There was no blood or masses within the nasopharynx. There was mild prominence of the nasopharyngeal soft tissue consistent with inflammatory reaction.  The fiberoptic endoscope was then carefully directed inferiorly and moved to the hypopharynx and glottis.  There was no fullness of the base of tongue.  There was 1-2+ prominence of the tonsils bilaterally (equally) and without exudate. There was no evidence of retropharyngeal erythema or edema.  There was mild  diffuse erythema  of the pharyngeal wall and supraglottic structure consistent with acute pharyngitis.  The true vocal cords appeared to be mobile bilaterally.  There was no evidence of foreign body or pooling of secretions, or obvious aspiration or penetration of liquid into the glottis.  The airway was widely patent. The patient tolerated the procedure well..

## 2020-10-14 NOTE — ED PROVIDER NOTE - CLINICAL SUMMARY MEDICAL DECISION MAKING FREE TEXT BOX
26 yo with findings are suspicious for retrobulbar post septal cellulitis without loculated fluid collection to suggest subperiosteal abscess. transferred for ent and ophtho eval

## 2020-10-14 NOTE — CONSULT NOTE ADULT - COMMENTS
Vital Signs Last 24 Hrs  T(C): 36.7 (14 Oct 2020 23:31), Max: 36.9 (14 Oct 2020 01:56)  T(F): 98 (14 Oct 2020 23:31), Max: 98.5 (14 Oct 2020 01:56)  HR: 81 (14 Oct 2020 23:31) (70 - 91)  BP: 133/97 (14 Oct 2020 23:31) (133/97 - 150/102)  BP(mean): 107 (14 Oct 2020 03:09) (107 - 110)  RR: 16 (14 Oct 2020 23:31) (16 - 20)  SpO2: 100% (14 Oct 2020 23:31) (96% - 100%)

## 2020-10-14 NOTE — ED CDU PROVIDER INITIAL DAY NOTE - MEDICAL DECISION MAKING DETAILS
25yM w/pmhx HTN, left orbital floor fracture in 2017 s/p repair with mesh presenting transferred to [facility] with left orbital cellulitis. Pt seen by ENT, recommending IV Unasyn, decadron 10mg x 1 dose, flonase and afrin BID, sinus rinses Q4 hours  Pending optho recommendations

## 2020-10-14 NOTE — ED PROVIDER NOTE - NSFOLLOWUPINSTRUCTIONS_ED_ALL_ED_FT
Please return should you have pain on movement of your eye or should you have worsening swelling or redness.  Please return if you experience vision changes.  Please follow up promptly with your primary care doctor and your ophthamologist for further care.

## 2020-10-14 NOTE — ED PROVIDER NOTE - OBJECTIVE STATEMENT
25 y M pmh of L orbital floor fx s/p repair in 2017 presenting with inferior left orbital pain.  States he gets this every single year at about the same time.  Typically improves with amox or augmentin and has been told it is sinusitis.  Notes that he had some bleeding from L nares earlier today.  No active bleeding currently.  Denies f/c/n/v/d.  Denies rhinorrhea.  Denies vision changes.  Denies fb sensation.

## 2020-10-14 NOTE — ED ADULT NURSE NOTE - OBJECTIVE STATEMENT
Pt presents to ED c/o pain in left eye. Pt states this happens every year with the season change since he had surgery for a fracture in 2017. Pt denies drainage from the eye, but endorses epistaxis intermittently from about 1500 up until 20 minutes PTA to ED. Pt denies blurred vision/blindness. Pt denies headache, no NVD. Pt presents to ED c/o pain in left eye. Pt states this happens every year with the season change since he had surgery for a fracture in 2017. Pt denies drainage from the eye, but endorses epistaxis intermittently from about 1500 up until 20 minutes PTA to ED. Pt denies blurred vision/blindness. Pt denies headache, no NVD. Currently no epistaxis.

## 2020-10-14 NOTE — ED STATDOCS - EYES, MLM
20/25 vision b/l. Tearing left eye. Mild left conjunctival injection. Soft tissue swelling inferior to left eye.

## 2020-10-14 NOTE — ED CDU PROVIDER INITIAL DAY NOTE - DETAILS
25yM w/pmhx HTN, left orbital floor fracture in 2017 s/p repair with mesh presenting transferred to [facility] with left orbital cellulitis.  Pt seen by ENT, recommending IV Unasyn, decadron 10mg x 1 dose, flonase and afrin BID, sinus rinses Q4 hours  Pending optho recommendations

## 2020-10-14 NOTE — ED CDU PROVIDER INITIAL DAY NOTE - ATTENDING CONTRIBUTION TO CARE
25yM w/pmhx HTN, left orbital floor fracture in 2017 s/p repair with mesh presenting transferred to [facility] with left orbital cellulitis. Pt seen by ENT, recommending IV Unasyn, decadron 10mg x 1 dose, flonase and afrin BID, sinus rinses Q4 hours, will reeval in am   Pending optho recommendations

## 2020-10-14 NOTE — CONSULT NOTE ADULT - SUBJECTIVE AND OBJECTIVE BOX
HPI:  The patient is a 25y Male with a past medical history significant for left orbital floor fx s/p mesh repair in 2017 who presents to the emergency room at [facility] with a chief complaint of left eye swelling and pain for 1 days. Patient initially presented to outside hosptial and CT scan suspicious for retrobulbar post septal cellulitis. Therefore patient was transferred to [facility] for ENT and Ophtho eval. Patient states this happens about 1x per year and usually improves with Augmentin. He went to the ED last night and was discharged with Augmentin but he represented today with worsening pain and blurry vision that has now resolved. Denies fevers chills. + facial pressure/pain on left. No discharge or drainage from nose. + Nasal congestion, no changes to sense of smell. No recent illness, no sick contacts.     HIV:    HIV Test Questions:  · In accordance with [place] State law, we offer every patient who comes to our ED an HIV test. Would you like to be tested today?	Previously Declined (within the last year)    PAST MEDICAL/SURGICAL/FAMILY/SOCIAL HISTORY:    Past Medical History:  HTN (hypertension)    No pertinent past medical history.     Tobacco Usage:  · Tobacco Usage	Never smoker    ALLERGIES AND HOME MEDICATIONS:   Allergies:        Allergies:  	No Known Drug Allergies:   	patient instructed he can not take anticoagulant medications due to risk in bleeding to left eye: Miscellaneous, Unknown, patient instructed he can not take anticoagulant medications due to risk in bleeding to left eye    Home Medications:   * Incomplete Medication History as of 14-Oct-2020 15:36 documented in Structured Notes  · 	Flonase 50 mcg/inh nasal spray: 2 spray(s) intranasally once a day   · 	Augmentin 875 mg-125 mg oral tablet: 1 tab(s) orally 2 times a day   · 	Nasonex 50 mcg/inh nasal spray: 2 spray(s) intranasally 2 times a day   · 	clindamycin 300 mg oral capsule: 1 cap(s) orally 4 times a day  · 	amLODIPine 5 mg oral tablet:   · 	carvedilol 40 mg oral capsule, extended release:     LABS:  CBC Full  -  ( 14 Oct 2020 13:21 )  WBC Count : 13.73 K/uL  Hemoglobin : 15.3 g/dL  Hematocrit : 46.1 %  Platelet Count - Automated : 441 K/uL  Mean Cell Volume : 90.2 fl  Mean Cell Hemoglobin : 29.9 pg  Mean Cell Hemoglobin Concentration : 33.2 gm/dL  Auto Neutrophil # : 9.51 K/uL  Auto Lymphocyte # : 2.76 K/uL  Auto Monocyte # : 1.23 K/uL  Auto Eosinophil # : 0.10 K/uL  Auto Basophil # : 0.08 K/uL  Auto Neutrophil % : 69.2 %  Auto Lymphocyte % : 20.1 %  Auto Monocyte % : 9.0 %  Auto Eosinophil % : 0.7 %  Auto Basophil % : 0.6 %

## 2020-10-14 NOTE — ED CDU PROVIDER INITIAL DAY NOTE - OBJECTIVE STATEMENT
25yM w/pmhx HTN, left orbital floor fracture in 2017 s/p repair with mesh 25yM w/pmhx HTN, left orbital floor fracture in 2017 s/p repair with mesh presenting transferred to [facility] with left orbital cellulitis. Pt reports yesterday he developed left eye pain with surrounding swelling. He was seen at [facility], initially discharged home on oral abx but returned when swelling worsening. Pt was then transferred to [facility] for ENT consult as CT showed "Compared to previous examination, there is worsening of the mixed density opacity in the left maxillary sinus which extends above the fracture line of the left orbital floor and causes extraconal fat stranding and stranding along the belly of the inferior rectus muscle. These findings are suspicious for retrobulbar post septal cellulitis without loculated fluid collection to suggest subperiosteal abscess". Pt reports earlier this morning he had double vision but that has since resolved. Pt denies fever/chills, drainage from eye, headache, dizziness, weakness or any other concerns.  Pt seen by ENT, recommending IV Unasyn, decadron 10mg x 1 dose, flonase and afrin BID, sinus rinses Q4 hours  Pending optho recommendations

## 2020-10-14 NOTE — ED STATDOCS - OBJECTIVE STATEMENT
24 y/o male with a PMHx of HTN on Amlodipine, left orbital floor fx s/p repair in 2017, presents to the ED c/o left facial pain and swelling since yesterday at 2pm. Pt reports bleeding from left nare last night. States he gets this every single year at about the same time. Typically improves with Amoxicillin or Augmentin. Pt was seen in ED last night for left eye pain and was d/c on Amoxicillin. Took 2 doses already. Pt presents to ED again for worsening pain. Wears glasses. No other complaints at this time. [facility] 26 y/o male with a PMHx of HTN on Amlodipine, left orbital floor fx s/p repair in 2017, presents to the ED c/o left facial pain and swelling since yesterday at 2pm. Pt reports bleeding from left nare last night. States he gets this every single year at about the same time. Typically improves with Amoxicillin or Augmentin. Pt was seen in ED last night for left eye pain and was d/c on Augmentin. Took 2 doses already. Pt presents to ED again for worsening pain. Wears glasses. No other complaints at this time. [facility]: [facility].

## 2020-10-15 VITALS
DIASTOLIC BLOOD PRESSURE: 95 MMHG | OXYGEN SATURATION: 99 % | RESPIRATION RATE: 17 BRPM | SYSTOLIC BLOOD PRESSURE: 133 MMHG | TEMPERATURE: 98 F | HEART RATE: 99 BPM

## 2020-10-15 LAB
HCT VFR BLD CALC: 44.5 % — SIGNIFICANT CHANGE UP (ref 39–50)
HGB BLD-MCNC: 14.5 G/DL — SIGNIFICANT CHANGE UP (ref 13–17)
MCHC RBC-ENTMCNC: 29.7 PG — SIGNIFICANT CHANGE UP (ref 27–34)
MCHC RBC-ENTMCNC: 32.6 % — SIGNIFICANT CHANGE UP (ref 32–36)
MCV RBC AUTO: 91.2 FL — SIGNIFICANT CHANGE UP (ref 80–100)
NRBC # FLD: 0 K/UL — SIGNIFICANT CHANGE UP (ref 0–0)
PLATELET # BLD AUTO: 415 K/UL — HIGH (ref 150–400)
PMV BLD: 10.5 FL — SIGNIFICANT CHANGE UP (ref 7–13)
RBC # BLD: 4.88 M/UL — SIGNIFICANT CHANGE UP (ref 4.2–5.8)
RBC # FLD: 12 % — SIGNIFICANT CHANGE UP (ref 10.3–14.5)
WBC # BLD: 11.7 K/UL — HIGH (ref 3.8–10.5)
WBC # FLD AUTO: 11.7 K/UL — HIGH (ref 3.8–10.5)

## 2020-10-15 PROCEDURE — 99284 EMERGENCY DEPT VISIT MOD MDM: CPT

## 2020-10-15 PROCEDURE — 99217: CPT

## 2020-10-15 RX ADMIN — AMLODIPINE BESYLATE 5 MILLIGRAM(S): 2.5 TABLET ORAL at 09:05

## 2020-10-15 RX ADMIN — Medication 100 MILLIGRAM(S): at 13:35

## 2020-10-15 RX ADMIN — Medication 1 SPRAY(S): at 06:11

## 2020-10-15 RX ADMIN — Medication 100 MILLIGRAM(S): at 00:51

## 2020-10-15 RX ADMIN — Medication 1 SPRAY(S): at 00:50

## 2020-10-15 RX ADMIN — Medication 100 MILLIGRAM(S): at 06:11

## 2020-10-15 RX ADMIN — OXYMETAZOLINE HYDROCHLORIDE 1 SPRAY(S): 0.5 SPRAY NASAL at 06:11

## 2020-10-15 NOTE — ED CDU PROVIDER DISPOSITION NOTE - NSFOLLOWUPCLINICS_GEN_ALL_ED_FT
Health system Ophthalmology  Ophthalmology  [address], [address]  [place] [address]  Phone: [phone]  Fax:     [facility] - Ophthalmology  Ophthalmology  [address], [address]  [place] [address]  Phone: [phone]  Fax:   Follow Up Time:

## 2020-10-15 NOTE — ED CDU PROVIDER DISPOSITION NOTE - PATIENT PORTAL LINK FT
You can access the FollowMyHealth Patient Portal offered by [facility] by registering at the following website: [email or web address]. By joining [facility]’s FollowMyHealth portal, you will also be able to view your health information using other applications (apps) compatible with our system.

## 2020-10-15 NOTE — ED CDU PROVIDER DISPOSITION NOTE - CARE PROVIDER_API CALL
Abilio Rivera (MD)  Otolaryngology  [address], [address]st [place] [address]  Phone: [phone]  Follow Up Time:    Abilio Rivera)  Otolaryngology  [address], [address]st [place] [address]  Phone: [phone]  Follow Up Time:     Adrián Garnica)  Plastic Surgery; Surgery  [address], [address]  [place] [address]  Phone: [phone]  Fax: [phone]  Follow Up Time:

## 2020-10-15 NOTE — ED CDU PROVIDER SUBSEQUENT DAY NOTE - PROGRESS NOTE DETAILS
ENT resident called, recommending switching abx to clindamycin. Will give stat dose of clinda IV then q 8 hours. Discussed sinus rinses, pt should first use afrin, then sinus rinse, followed by flonase

## 2020-10-15 NOTE — ED CDU PROVIDER DISPOSITION NOTE - CARE PROVIDERS DIRECT ADDRESSES
,DirectAddress_Unknown ,DirectAddress_Unknown,[email or web address]

## 2020-10-15 NOTE — PROGRESS NOTE ADULT - SUBJECTIVE AND OBJECTIVE BOX
Patient seen and examined at bedside. Feeling much improved. No changes to vision, no double vision. Reports pain 1/10 from 10/10 yesterday. no headache, no fevers, no neck stiffness.     Vital Signs Last 24 Hrs  T(C): 36.8 (15 Oct 2020 13:55), Max: 36.9 (15 Oct 2020 09:05)  T(F): 98.2 (15 Oct 2020 13:55), Max: 98.4 (15 Oct 2020 09:05)  HR: 99 (15 Oct 2020 13:55) (67 - 99)  BP: 133/95 (15 Oct 2020 13:55) (117/80 - 134/100)  BP(mean): --  RR: 17 (15 Oct 2020 13:55) (15 - 18)  SpO2: 99% (15 Oct 2020 13:55) (99% - 100%)    General: NAD, A+Ox3  No respiratory distress, stridor, or stertor  Voice quality: normal  Face:  Symmetric without masses or lesions  OU: PERRL, EOMI, 'tightness' with left lateral gaze improved left eye with mild periorbital edema improved, eye open at rest, no injection. minimal tenderness on L maxilla. some loss of sensation noted along distribution of L infraorbital nerve      A/P: 25 M with hx of L orbital floor fracture with left eye pain and swelling with imaging c/w retrobulbar cellulitis with L maxillary sinus opacification. much improved on IV abx  - continue clinda for 10 days  - f/u Dr Abilio Rivera in clinic. Follow up with the ENT (Ear, Nose, Throat) department after discharge. Call [phone] for appointment.   - Appreciate ophtho recs  - Per Ophtho- please consult facial trauma to assess orbital floor fracture, no evidence of plate or mesh on CT and given persistent infections may require further surgical intervention   - Afrin (Oxymetasoline) 2 sprays BID to bilateral nares before first and last rinse of the day x 3 days then stop  - Flonase 2 sprays BID to bilateral nares afer first and last rinse of the day  - Sinus rinse q4h: NeilMed sinus rinse kit OR  60 cc Jaci syringe- fill syringe with 60cc of noraml saline and rinse 30cc through eacn nare   - Discussed with Dr. Keith

## 2020-10-15 NOTE — PROGRESS NOTE ADULT - SUBJECTIVE AND OBJECTIVE BOX
Consult Note Adult-Ophthalmology Resident [Charted Location: [facility] CDU 11] [Authored: 14-Oct-2020 23:26]- for Visit: [id number], In Progress, Entered, Signed w/additional Signatures Pending, General    Consult Note:    Provider Specialty:  Ophthalmology.    Referral/Consultation:    Initial Consult:  · Requested by Name:	ED  · Date/Time:	14-Oct-2020 23:26  · Reason for Referral/Consultation:	Orbital cellulitis      · Subjective and Objective:   [facility] OF OPHTHALMOLOGY - INITIAL ADULT CONSULT  -----------------------------------------------------------------------------------------------------------------  Eric Shen MD PGY 2  Pager: [phone]  -----------------------------------------------------------------------------------------------------------------    HPI: ED HPI   · Chief Complaint: The patient is a 25y Male complaining of  · HPI Objective Statement: pt transferred from [place] for ENT and ophtho eval for findings are suspicious for retrobulbar post septal cellulitis without loculated fluid collection to suggest subperiosteal abscess. ENT correlation is recommended. Pt complaining of pain with eye movement. no fevers, no chills  	26 y/o male with a PMHx of HTN on Amlodipine, left orbital floor fx s/p repair in 2017, presents to the ED c/o left facial pain and swelling since yesterday at 2pm. Pt reports bleeding from left nare last night. States he gets this every single year at about the same time. Typically improves with Amoxicillin or Augmentin. Pt was seen in ED last night for left eye pain and was d/c on Augmentin. Took 2 doses already. Pt presents to ED again for worsening pain. Wears glasses. No other complaints at this time. [facility]  	visual acuity in [place] normal  and mildly elevated tonometry 23 . pt given unasyn in [place]     	Compared to previous examination, there is worsening of the mixed density opacity in the left maxillary sinus which extends above the fracture line of the left orbital floor and causes extraconal fat stranding and stranding along the belly of the inferior rectus muscle. These findings are suspicious for retrobulbar post septal cellulitis without loculated fluid collection to suggest subperiosteal abscess. ENT correlation is recommended.  Patient reports having Double vision in the AM that has since improved.    Pt. Denies decreased or blurry vision, Eye irritation , flashes or floater    PAST MEDICAL & SURGICAL HISTORY:  HTN (hypertension)    No pertinent past medical history    H/O eye surgery      Past Ocular History: Left Orbital Floor Fracture s/p repair with mesh 2017    Family History:  FAMILY HISTORY:  No pertinent family history in first degree relatives    Ophthalmic Medications: N/A    MEDICATIONS  (STANDING):  amLODIPine   Tablet 5 milliGRAM(s) Oral daily  ampicillin/sulbactam  IVPB      fluticasone propionate 50 MICROgram(s)/spray Nasal Spray 1 Spray(s) Both Nostrils two times a day  oxymetazoline 0.05% Nasal Spray 1 Spray(s) Both Nostrils two times a day    MEDICATIONS  (PRN):    Allergies & Intolerances:   patient instructed he can not take anticoagulant medications due to risk in bleeding to left eye (Unknown)    Review of Systems:  Constitutional: No fever, chills  Eyes: As Above  Neuro: No tremors  Cardiovascular: No chest pain, palpitations  Respiratory: No SOB, no cough  GI: No nausea, vomiting, abdominal pain    VITALS: T(C): 36.8 (10-14-20 @ 17:37)  T(F): 98.2 (10-14-20 @ 17:37), Max: 98.5 (10-14-20 @ 01:56)  HR: 72 (10-14-20 @ 17:37) (70 - 91)  BP: 150/102 (10-14-20 @ 17:37) (138/106 - 150/102)  RR:  (18 - 20)  SpO2:  (96% - 100%)  Wt(kg): --  General: AAO x 3, appropriate mood and affect    Ophthalmology Exam:  Visual acuity (cc Near) 20/20 OU.  Pupils: PERRL OU, no APD  Tonometry: 10/12  Extraocular movements (EOMs): Slight Restriction UP gaze OU and "tightness"(not pain as per patient, and possible cause for decreased upgaze movement) OS on upgaze with Left Hypertropia, no diplopia.  Confrontational Visual Field (CVF): Full OU.  Color Plates: 12/12 OU.    Pen Light Exam (PLE)  External: OD: Normal OS: Tenderness and swelling over lower lid and Maxillary bone, With very slight restriction to Retropulsion compared to OD, No proptosis appreciated.  Lids/Lashes/Lacrimal Ducts: Flat OU.  Sclera/Conjunctiva: OD - White and quiet Os- Trace Injection   Cornea: Clear OU.  Anterior Chamber: Deep and formed OU.    Iris: Flat OU.  Lens: Clear OU.    Fundus Exam: dilated with 1% tropicamide and 2.5% phenylephrine  Approval obtained from primary team for dilation  Patient aware that pupils can remained dilated for at least 4-6 hours.  Exam performed with 20 D lens    Vitreous: wnl OU  Disc, cup/disc: sharp and pink, 0.4 OU  Macula: wnl OU  Vessels: wnl OU  Periphery: OD-wnl OS Temporal CRS with PIgment    Labs/Imaging:                        15.3   13.73 )-----------( 441      ( 14 Oct 2020 13:21 )             46.1   10-14    137  |  106  |  10  ----------------------------<  114<H>  4.0   |  25  |  0.83    Ca    9.7      14 Oct 2020 13:21    TPro  9.0<H>  /  Alb  4.4  /  TBili  0.4  /  DBili  x   /  AST  30  /  ALT  85<H>  /  AlkPhos  90  10-14    < from: CT Maxillofacial w/ IV Cont (10.14.20 @ 14:07) >  Findings:  Again seen is the left inferior orbital fracture and fracture of the left lamina papyracea. There is almost complete opacification of the left maxillary sinus which has mildly progressed since prior examination and is similar to the study of 1/15/2018. The opacification is of mixed density and is above the fracture line of the left orbital floor. There is stranding of the extraconal fat of the retrobulbar compartment of the left orbit with mild left premalar/infraorbital stranding. There is also stranding along the midportion of the belly of the inferior rectus.    The visualized globes are symmetric bilaterally and the lenses are normally situated. There is no evidence of orbital subperiosteal abscess. The extraocular muscles and optic nerve sheaths are normal bilaterally.      The soft tissues at the skull base and intracranially are within limits of normal.    The visualized suprahyoid compartment including the  space,  fat, lateral parapharyngeal spaces are intact. Again seen is a soft tissue density adjacentto the hyoid bone which is stable since prior examination and may represent the remnant pyramidal lobe or a thyroglossal duct cyst.    The sublingual glands and parotid glands enhance uniformly. There is no enlarged lymphadenopathy in the visualized portion of the neck.      IMPRESSION:    Compared to previous examination, there is worsening of the mixed density opacity in the left maxillary sinus which extends above the fracture line of the left orbital floor and causes extraconal fat stranding and stranding along the belly of the inferior rectus muscle. These findings are suspicious for retrobulbar post septal cellulitis without loculated fluid collection to suggest subperiosteal abscess. ENT correlation is recommended.    There is also left premalar soft tissue stranding.    < end of copied text >        Assessment and Recommendation:   · Assessment	  Assessment and Recommendations:  25y male with a past medical history/ocular history of Left Orbital Floor Fracture consulted for Orbital Cellulitis, found to have Orbital Cellulitis secondary to Maxillary Sinus Infection spreading through previous Orbital Floor Fracture.    # Orbital Cellulitis - Left eye  - Extension of Mixed Density opacity from Maxillary sinus to Left Orbital floor with Evidence of Orbital Cellulitis on Imaging  - Slight resistance to Retropulsion and EOM essentially full with slight Restriction upgaze OU possibly due to discomfort, No Proptosis  - Globe intact with no evidence of Compartment Syndrome   - Dilated Exam and Anterior pen light exam appears normal with no signs of Intraocular abnormalities.   - No Acute Ophthalmological intervention  - Continue Treatment management as per ENT  - Will follow    # Left Orbital Floor Fracture  - Chronic Floor fracture with history of Repair with mesh  - Patient reports multiple episode of SInus infections treated with Oral Antibiotics  - Current infection appears to have spread through old fracture  - Plastics Consult recommended to evaluate for any possible intervention/follow up regarding this Fracture/Repair History    SDW Dr. Roblero PGY4  DW Dr. Anton - Oculoplastics attending     Outpatient Follow-up: Patient should follow-up with his/her ophthalmologist or with [facility] Department of Ophthalmology within 1 week of after discharge at:    [address]  [place] [address]  [phone]         interval Hx: Follow up for orbital cellulitis (mucosinusitis) pt feels back to baseline.       Ophthalmology Exam:  Visual acuity (cc Near) 20/20 OU.  Pupils: PERRL OU, no APD  Tonometry: 10/12  Extraocular movements (EOMs): Full OU, OD looks limited upgaze likely 2/2 enophthalmos of OS, but full EOM OU   Confrontational Visual Field (CVF): Full OU.      Pen Light Exam (PLE)  External: OD: Normal OS: Enophthalmos OS, no RTR, no Proptosis, minimal edema lower lid. \  Lids/Lashes/Lacrimal Ducts: Flat OU.  Sclera/Conjunctiva:  White and quiet OU  Cornea: Clear OU.  Anterior Chamber: Deep and formed OU.    Iris: Flat OU.  Lens: Clear OU.                          15.3   13.73 )-----------( 441      ( 14 Oct 2020 13:21 )             46.1   10-14    137  |  106  |  10  ----------------------------<  114<H>  4.0   |  25  |  0.83    Ca    9.7      14 Oct 2020 13:21    TPro  9.0<H>  /  Alb  4.4  /  TBili  0.4  /  DBili  x   /  AST  30  /  ALT  85<H>  /  AlkPhos  90  10-14    < from: CT Maxillofacial w/ IV Cont (10.14.20 @ 14:07) >  Findings:  Again seen is the left inferior orbital fracture and fracture of the left lamina papyracea. There is almost complete opacification of the left maxillary sinus which has mildly progressed since prior examination and is similar to the study of 1/15/2018. The opacification is of mixed density and is above the fracture line of the left orbital floor. There is stranding of the extraconal fat of the retrobulbar compartment of the left orbit with mild left premalar/infraorbital stranding. There is also stranding along the midportion of the belly of the inferior rectus.    The visualized globes are symmetric bilaterally and the lenses are normally situated. There is no evidence of orbital subperiosteal abscess. The extraocular muscles and optic nerve sheaths are normal bilaterally.      The soft tissues at the skull base and intracranially are within limits of normal.    The visualized suprahyoid compartment including the  space,  fat, lateral parapharyngeal spaces are intact. Again seen is a soft tissue density adjacentto the hyoid bone which is stable since prior examination and may represent the remnant pyramidal lobe or a thyroglossal duct cyst.    The sublingual glands and parotid glands enhance uniformly. There is no enlarged lymphadenopathy in the visualized portion of the neck.      IMPRESSION:    Compared to previous examination, there is worsening of the mixed density opacity in the left maxillary sinus which extends above the fracture line of the left orbital floor and causes extraconal fat stranding and stranding along the belly of the inferior rectus muscle. These findings are suspicious for retrobulbar post septal cellulitis without loculated fluid collection to suggest subperiosteal abscess. ENT correlation is recommended.    There is also left premalar soft tissue stranding.    < end of copied text >        Assessment and Recommendation:   · Assessment	  Assessment and Recommendations:  25y male with a past medical history/ocular history of Left Orbital Floor Fracture consulted for Orbital Cellulitis, found to have Orbital Cellulitis secondary to Maxillary Sinus Infection spreading through previous Orbital Floor Fracture.    # Orbital Cellulitis - Left eye  - Extension of Mixed Density opacity from Maxillary sinus to Left Orbital floor with Evidence of Orbital Cellulitis on Imaging  - No RTR or proptosis. Pt has enophthalmos OS   - improved on exam today  - Pt has connection on CT scan of the orbit and the sinus, hence why he gets yearly orbital inflammation and infection requiring antibiotics  - recommend facial plastics w  to reevaluate the mesh as it seems like there is an open connection btw orbit and sinus.     # Left Orbital Floor Fractur  - Patient reports multiple episode of SInus infections treated with Oral Antibiotics  - Current infection appears to have spread through the fracture as there is a connection  - as above    SDW Dr Martha Anton    Outpatient Follow-up: Patient should follow-up with his/her ophthalmologist or with [facility] Department of Ophthalmology within 1 week of after discharge at:    [address]  [place] [address]  [phone]         interval Hx: Follow up for orbital cellulitis (mucosinusitis) pt feels back to baseline.       Ophthalmology Exam:  Visual acuity (cc Near) 20/20 OU.  Pupils: PERRL OU, no APD  Tonometry: 10/12  Extraocular movements (EOMs): Full OU, OD looks limited upgaze likely 2/2 enophthalmos of OS, but full EOM OU   Confrontational Visual Field (CVF): Full OU.      Pen Light Exam (PLE)  External: OD: Normal OS: Enophthalmos OS, no RTR, no Proptosis, minimal edema lower lid. \  Lids/Lashes/Lacrimal Ducts: Flat OU.  Sclera/Conjunctiva:  White and quiet OU  Cornea: Clear OU.  Anterior Chamber: Deep and formed OU.    Iris: Flat OU.  Lens: Clear OU.                          15.3   13.73 )-----------( 441      ( 14 Oct 2020 13:21 )             46.1   10-14    137  |  106  |  10  ----------------------------<  114<H>  4.0   |  25  |  0.83    Ca    9.7      14 Oct 2020 13:21    TPro  9.0<H>  /  Alb  4.4  /  TBili  0.4  /  DBili  x   /  AST  30  /  ALT  85<H>  /  AlkPhos  90  10-14    < from: CT Maxillofacial w/ IV Cont (10.14.20 @ 14:07) >  Findings:  Again seen is the left inferior orbital fracture and fracture of the left lamina papyracea. There is almost complete opacification of the left maxillary sinus which has mildly progressed since prior examination and is similar to the study of 1/15/2018. The opacification is of mixed density and is above the fracture line of the left orbital floor. There is stranding of the extraconal fat of the retrobulbar compartment of the left orbit with mild left premalar/infraorbital stranding. There is also stranding along the midportion of the belly of the inferior rectus.    The visualized globes are symmetric bilaterally and the lenses are normally situated. There is no evidence of orbital subperiosteal abscess. The extraocular muscles and optic nerve sheaths are normal bilaterally.      The soft tissues at the skull base and intracranially are within limits of normal.    The visualized suprahyoid compartment including the  space,  fat, lateral parapharyngeal spaces are intact. Again seen is a soft tissue density adjacentto the hyoid bone which is stable since prior examination and may represent the remnant pyramidal lobe or a thyroglossal duct cyst.    The sublingual glands and parotid glands enhance uniformly. There is no enlarged lymphadenopathy in the visualized portion of the neck.      IMPRESSION:    Compared to previous examination, there is worsening of the mixed density opacity in the left maxillary sinus which extends above the fracture line of the left orbital floor and causes extraconal fat stranding and stranding along the belly of the inferior rectus muscle. These findings are suspicious for retrobulbar post septal cellulitis without loculated fluid collection to suggest subperiosteal abscess. ENT correlation is recommended.    There is also left premalar soft tissue stranding.    < end of copied text >        Assessment and Recommendation:   · Assessment	  Assessment and Recommendations:  25y male with a past medical history/ocular history of Left Orbital Floor Fracture consulted for Orbital Cellulitis, found to have Orbital Cellulitis secondary to Maxillary Sinus Infection spreading through previous Orbital Floor Fracture.    # Orbital Cellulitis - Left eye  - Extension of Mixed Density opacity from Maxillary sinus to Left Orbital floor with Evidence of Orbital Cellulitis on Imaging  - No RTR or proptosis. Pt has enophthalmos OS   - improved on exam today  - Pt has connection on CT scan of the orbit and the sinus, hence why he gets yearly orbital inflammation and infection requiring antibiotics  - recommend facial plastics w  to reevaluate the mesh as it seems like there is an open connection btw orbit and sinus.   - spoke to Dr Anton who referred him to  as he deals with these kind of cases      # Left Orbital Floor Fractur  - Patient reports multiple episode of SInus infections treated with Oral Antibiotics  - Current infection appears to have spread through the fracture as there is a connection  - as above    SDW Dr Martha Anton    Outpatient Follow-up: Patient should follow-up with his/her ophthalmologist or with [facility] Department of Ophthalmology within 1 week of after discharge at:    [address]  [place], [place] [id number] [phone]

## 2020-10-15 NOTE — PROGRESS NOTE ADULT - SUBJECTIVE AND OBJECTIVE BOX
Patient seen and examined at bedside. Feeling mildly improved. No changes to vision    Physical Exam  T(C): 36.7 (10-14-20 @ 23:31), Max: 36.9 (10-14-20 @ 12:45)  HR: 67 (10-15-20 @ 01:50) (67 - 91)  BP: 134/100 (10-15-20 @ 01:50) (133/97 - 150/102)  RR: 15 (10-15-20 @ 01:50) (15 - 20)  SpO2: 100% (10-15-20 @ 01:50) (96% - 100%)      General: NAD, A+Ox3  No respiratory distress, stridor, or stertor  Voice quality: normal  Face:  Symmetric without masses or lesions  OU: PERRL, EOMI, pain with left lateral gaze improved left eye with mild periorbital edema improved, eye open at rest, no injection   Nose: nasal cavity clear bilaterally, inferior turbinates normal, mucosa normal without crusting or bleeding  OC/OP: tongue normal, floor of mouth wnl, no masses or lesions, OP clear  Neck: soft/flat, no LAD  CNII-XII intact      A/P: 25 M with hx of L orbital floor fracture with left eye pain and swelling with imaging c/w retrobulbar cellulitis with L maxillary sinus opacification.  - IV clinda 900 q8  - Appreciate ophtho recs  - Per Ophtho- please consult facial trauma to assess orbital floor fracture, no evidence of plate or mesh on CT and given persistent infections may require further surgical intervention   - Afrin (Oxymetasoline) 2 sprays BID to bilateral nares before first and last rinse of the day x 3 days then stop  - Flonase 2 sprays BID to bilateral nares afer first and last rinse of the day  - Sinus rinse q4h: [facility]Med sinus rinse kit OR  60 cc Jaci syringe- fill syringe with 60cc of noraml saline and rinse 30cc through eacn nare   - Discussed with Dr. Keith

## 2020-10-15 NOTE — ED CDU PROVIDER DISPOSITION NOTE - PROVIDER TOKENS
PROVIDER:[TOKEN:[[id number]]] PROVIDER:[TOKEN:[[id number]]],PROVIDER:[TOKEN:[[id number]]]

## 2020-10-15 NOTE — ED CDU PROVIDER SUBSEQUENT DAY NOTE - ATTENDING CONTRIBUTION TO CARE
Dr. Bill:  I performed a face to face bedside interview with patient regarding history of present illness, review of symptoms and past medical history. I completed an independent physical exam.  I have discussed patient's plan of care with PA.   I agree with note as stated above, having amended the EMR as needed to reflect my findings.   This includes HISTORY OF PRESENT ILLNESS, HIV, PAST MEDICAL/SURGICAL/FAMILY/SOCIAL HISTORY, ALLERGIES AND HOME MEDICATIONS, REVIEW OF SYSTEMS, PHYSICAL EXAM, and any PROGRESS NOTES during the time I functioned as the attending physician for this patient.    25M h/o left orbital fracture 2017 s/p plastic mesh repair (in [facility]) transferred to [facility] from [place] for left orbital cellulitis.  Feels improved this morning.  Denies visual deficits.    Exam:  - nad  - rrr  - ctab   -abd soft ntnd  - +swelling to left orbital area extending over maxillary sinus, +slightly restricted upward gaze of left eye, PERRL    A/P  - orbital cellulitis  - continue IV abx  - appreciate ENT and ophthalmology recs, will attempt to contact plastics or oral surgery regarding previous repair

## 2020-10-15 NOTE — ED CDU PROVIDER SUBSEQUENT DAY NOTE - MEDICAL DECISION MAKING DETAILS
25yM w/pmhx HTN, left orbital floor fracture in 2017 s/p repair with mesh presenting transferred to [facility] with left orbital cellulitis. Pt seen by ENT, recommending IV Clindamycin, decadron 10mg x 1 dose, flonase and afrin BID, sinus rinses Q4 hours. Optho consulted, no acute interventions, outpt f/u. Pt to be evaluated by ENT this morning

## 2020-10-15 NOTE — ED CDU PROVIDER DISPOSITION NOTE - CLINICAL COURSE
25yM w/pmhx HTN, left orbital floor fracture in 2017 s/p repair with mesh presenting transferred to [facility] with left orbital cellulitis. Pt reports pain and swelling around left eye began 2 days ago. Pt seen by ENT, CDU for IV Clindamycin, flonase and afrin BID, sinus rinses Q4 hours. Pt was evaluated by optho, no acute interventions, recommend outpt follow up in clinic, discussion with plastics and OMFS, pt needs to follow with oculoplastic speciality for possibly revision of removal of mesh, will provide speciality name to patient. 25yM w/pmhx HTN, left orbital floor fracture in 2017 s/p repair with mesh presenting transferred to [facility] with left orbital cellulitis. Pt reports pain and swelling around left eye began 2 days ago. Pt seen by ENT, CDU for IV Clindamycin, flonase and afrin BID, sinus rinses Q4 hours. Pt was evaluated by optho, no acute interventions, recommend outpt follow up in clinic, discussion with plastics and OMFS, pt needs to follow with oculoplastic speciality to explore possibly revision of removal of mesh, will provide speciality name to patient.  Follow up with ENT outpatient.  Discharged with clindamycin and return precautions.

## 2020-10-15 NOTE — ED CDU PROVIDER DISPOSITION NOTE - ATTENDING CONTRIBUTION TO CARE
Dr. Bill:  I performed a face to face bedside interview with patient regarding history of present illness, review of symptoms and past medical history. I completed an independent physical exam.  I have discussed patient's plan of care with PA.   I agree with note as stated above, having amended the EMR as needed to reflect my findings.   This includes HISTORY OF PRESENT ILLNESS, HIV, PAST MEDICAL/SURGICAL/FAMILY/SOCIAL HISTORY, ALLERGIES AND HOME MEDICATIONS, REVIEW OF SYSTEMS, PHYSICAL EXAM, and any PROGRESS NOTES during the time I functioned as the attending physician for this patient.

## 2020-10-15 NOTE — ED CDU PROVIDER DISPOSITION NOTE - NSFOLLOWUPINSTRUCTIONS_ED_ALL_ED_FT
Follow up with your Primary Medical Doctor within 2-3days. If results or reports were given to you, show copies of your reports given to you. Take all of your medications as previously prescribed.    If you have issues obtaining follow up, please call: [phone] to obtain a doctor or specialist who takes your insurance in your area.    Follow-up with his/her ophthalmologist or with [facility] Department of Ophthalmology within 1 week of after discharge at:    [address]  [place] [address]  [phone]    Please follow with Oculoplastic specialist within 1-2 weeks to discuss. If you have issues obtaining follow up, please call: [phone] to obtain a doctor or specialist who takes your insurance in your area.    Please Follow with Dr. Rivera within one week call number provided make and appointment     -Take Clindamycin 300mg (1 tablet) 4 times a day for 10 days  - Afrin (Oxymetasoline) 2 sprays twice to bilateral nares before first and last rinse of the day for one more day.  - Flonase 2 sprays twice to bilateral nares after first and last rinse of the day  -Normal Saline sprays 4 times a day.    If any new fevers, worsening headache, swelling ,eye pain, visual changes or any other new or concerning symptoms return to the ED Follow up with your Primary Medical Doctor within 2-3days. If results or reports were given to you, show copies of your reports given to you. Take all of your medications as previously prescribed.    If you have issues obtaining follow up, please call: [phone] to obtain a doctor or specialist who takes your insurance in your area.    Follow-up with his/her ophthalmologist or with [facility] Department of Ophthalmology within 1 week of after discharge at:    [address]  [place] [address]  [phone]    Please follow with Oculoplastic specialist within 1-2 weeks to discuss. If you have issues obtaining follow up, please call: [phone] to obtain a doctor or specialist who takes your insurance in your area.    Please Follow with Dr. Rivera (ENT) within one week call number provided make and appointment     -Take Clindamycin 300mg (1 tablet) 4 times a day for 10 days  - Afrin (Oxymetasoline) 2 sprays twice to bilateral nares before first and last rinse of the day for one more day.  - Flonase 2 sprays twice to bilateral nares after first and last rinse of the day  -Normal Saline sprays 4 times a day.    Recommend follow up with Dr. Adrián Garnica (plastics) for re-evaluation of your previous orbital fracture repair, especially given the recurrent infections.      If any new fevers, worsening headache, swelling ,eye pain, visual changes or any other new or concerning symptoms return to the ED

## 2020-10-16 PROBLEM — I10 ESSENTIAL (PRIMARY) HYPERTENSION: Chronic | Status: ACTIVE | Noted: 2020-10-14

## 2020-10-27 ENCOUNTER — APPOINTMENT (OUTPATIENT)
Dept: OTOLARYNGOLOGY | Facility: CLINIC | Age: 25
End: 2020-10-27
Payer: OTHER GOVERNMENT

## 2020-10-27 VITALS
HEART RATE: 82 BPM | HEIGHT: 68 IN | DIASTOLIC BLOOD PRESSURE: 70 MMHG | BODY MASS INDEX: 31.83 KG/M2 | TEMPERATURE: 97.2 F | WEIGHT: 210 LBS | SYSTOLIC BLOOD PRESSURE: 110 MMHG

## 2020-10-27 PROCEDURE — 99072 ADDL SUPL MATRL&STAF TM PHE: CPT

## 2020-10-27 PROCEDURE — 31231 NASAL ENDOSCOPY DX: CPT

## 2020-10-27 PROCEDURE — 99243 OFF/OP CNSLTJ NEW/EST LOW 30: CPT | Mod: 25

## 2020-10-27 RX ORDER — SUMATRIPTAN 20 MG/1
20 SPRAY NASAL
Qty: 12 | Refills: 5 | Status: DISCONTINUED | COMMUNITY
Start: 2019-04-12 | End: 2020-10-27

## 2020-10-27 RX ORDER — LISINOPRIL 40 MG/1
40 TABLET ORAL
Refills: 0 | Status: DISCONTINUED | COMMUNITY
End: 2020-10-27

## 2020-10-27 RX ORDER — ELETRIPTAN HYDROBROMIDE 40 MG/1
40 TABLET, FILM COATED ORAL
Qty: 12 | Refills: 5 | Status: DISCONTINUED | COMMUNITY
Start: 2019-03-08 | End: 2020-10-27

## 2020-11-02 ENCOUNTER — OUTPATIENT (OUTPATIENT)
Dept: OUTPATIENT SERVICES | Facility: HOSPITAL | Age: 25
LOS: 1 days | End: 2020-11-02
Payer: COMMERCIAL

## 2020-11-02 ENCOUNTER — APPOINTMENT (OUTPATIENT)
Dept: CT IMAGING | Facility: CLINIC | Age: 25
End: 2020-11-02
Payer: COMMERCIAL

## 2020-11-02 ENCOUNTER — RESULT REVIEW (OUTPATIENT)
Age: 25
End: 2020-11-02

## 2020-11-02 ENCOUNTER — TRANSCRIPTION ENCOUNTER (OUTPATIENT)
Age: 25
End: 2020-11-02

## 2020-11-02 DIAGNOSIS — H05.012 CELLULITIS OF LEFT ORBIT: ICD-10-CM

## 2020-11-02 DIAGNOSIS — Z98.890 OTHER SPECIFIED POSTPROCEDURAL STATES: Chronic | ICD-10-CM

## 2020-11-02 DIAGNOSIS — J01.90 ACUTE SINUSITIS, UNSPECIFIED: ICD-10-CM

## 2020-11-02 DIAGNOSIS — Z00.8 ENCOUNTER FOR OTHER GENERAL EXAMINATION: ICD-10-CM

## 2020-11-02 PROCEDURE — 70486 CT MAXILLOFACIAL W/O DYE: CPT | Mod: 26

## 2020-11-02 PROCEDURE — 70486 CT MAXILLOFACIAL W/O DYE: CPT

## 2020-11-19 ENCOUNTER — APPOINTMENT (OUTPATIENT)
Dept: OTOLARYNGOLOGY | Facility: CLINIC | Age: 25
End: 2020-11-19
Payer: COMMERCIAL

## 2020-11-19 VITALS
SYSTOLIC BLOOD PRESSURE: 139 MMHG | DIASTOLIC BLOOD PRESSURE: 93 MMHG | BODY MASS INDEX: 31.83 KG/M2 | HEART RATE: 81 BPM | HEIGHT: 68 IN | WEIGHT: 210 LBS

## 2020-11-19 PROCEDURE — 31231 NASAL ENDOSCOPY DX: CPT

## 2020-11-19 PROCEDURE — 99213 OFFICE O/P EST LOW 20 MIN: CPT | Mod: 25

## 2020-11-19 NOTE — HISTORY OF PRESENT ILLNESS
[de-identified] : 25M presents for evaluation of recurrent L-sided orbital cellulitis\par Suffered L orbital blowout injury in 2017 while [employer]-- heavy object fell on L eye\par Per imaging records  from that time (report only) -- "blowout fractures of the L ethmoidal and maxillary sinuses-- from a radiographic standpoint entrapment of the L inferior rectus muscle would not be excluded"\par [place]-- says he had "plastic mesh" placed along orbital floor for reconstruction\par Post-op did well w no diplopia or worsening in his visual acuity\par \par Several months post-op, had URI symptoms that led to L-eye swelling that improved with PO abx\par He has experienced similar episodes approximately every year since the injury\par Approximately 2 weeks ago, he experienced severe L-sided eye swelling, pain with diplopia (upward case) that prompted presentation to [place] [facility]-- was subsequently transferred and admitted at [facility] for treatment of orbital cellulitis-- this improved with IV abx and he was discharged after 2 days on 10/15\par CT from that time demonstrated L maxillary mixed density with extension into inferior orbital, inferior rectus fat stranding, orbital floor defect with concern for orbital cellulitis\par Patient also reported L-sided facial pain as well as L-sided epistaxis at that time\par \par Recently completed PO abx\par Presently denies all symptoms-- visual acuity good, no diplopia\par Some whitish anterior rhinorrhea\par Good sense of smell\par Does have some mild L V2 numbness\par Since surgery, patient reports he is unable to perform any rigorous exercise because he feels pressure along bottom of L eye\par \par

## 2020-11-19 NOTE — PHYSICAL EXAM
[Nasal Endoscopy Performed] : nasal endoscopy was performed, see procedure section for findings [Normal] : no rashes [de-identified] : L maxillary tenderness [de-identified] : L hypoglobus

## 2020-11-19 NOTE — REASON FOR VISIT
[Initial Consultation] : an initial consultation for [FreeTextEntry2] : Recurrent L-sided orbital cellulitis

## 2020-11-19 NOTE — DATA REVIEWED
[de-identified] : EXAM: CT MAXILLOFACIAL IC \par \par \par PROCEDURE DATE: 10/14/2020 \par \par \par \par INTERPRETATION: CT MAXILLOFACIAL WITH INTRAVENOUS CONTRAST. \par \par Clinical indication: History of left orbital floor fracture with a left-sided facial pain. \par \par Technique:Axial contrast-enhanced CT scans of maxillofacial region were performed. Sagittal and coronal reconstructions were obtained. Dose reduction techniques were utilized. Sagittal and coronal reconstructed images were also obtained. \par \par DOSE REPORT: Radiation Dose Estimate (DLP) Dose 637.39 mGy-cm. \par \par Contrast: 90 mL of intravenous contrast Omnipaque 350 was administered. \par \par Comparison: CT maxillofacial bones, 12/6/2019 and 1/15/2018. \par \par Findings: \par Again seen is the left inferior orbital fracture and fracture of the left lamina papyracea. There is almost complete opacification of the left maxillary sinus which has mildly progressed since prior examination and is similar to the study of 1/15/2018. The opacification is of mixed density and is above the fracture line of the left orbital floor. There is stranding of the extraconal fat of the retrobulbar compartment of the left orbit with mild left premalar/infraorbital stranding. There is also stranding along the midportion of the belly of the inferior rectus. \par \par The visualized globes are symmetric bilaterally and the lenses are normally situated. There is no evidence of orbital subperiosteal abscess. The extraocular muscles and optic nerve sheaths are normal bilaterally. \par \par \par The soft tissues at the skull base and intracranially are within limits of normal. \par \par The visualized suprahyoid compartment including the  space,  fat, lateral parapharyngeal spaces are intact. Again seen is a soft tissue density adjacent to the hyoid bone which is stable since prior examination and may represent the remnant pyramidal lobe or a thyroglossal duct cyst. \par \par The sublingual glands and parotid glands enhance uniformly. There is no enlarged lymphadenopathy in the visualized portion of the neck. \par \par \par IMPRESSION: \par \par Compared to previous examination, there is worsening of the mixed density opacity in the left maxillary sinus which extends above the fracture line of the left orbital floor and causes extraconal fat stranding and stranding along the belly of the inferior rectus muscle. These findings are suspicious for retrobulbar post septal cellulitis without loculated fluid collection to suggest subperiosteal abscess. ENT correlation is recommended. \par \par There is also left premalar soft tissue stranding. \par \par Notification to clinician of alert: \par \par Provider Dr. Bragg was notified about the impression 1437 on 10/14/2020 via telephone by Neuroradiologist NEY Escalante. Readback confirmation was obtained. \par \par \par \par \par \par MARY ESCALANTE M.D., ATTENDING RADIOLOGIST \par This document has been electronically signed. Oct 14 2020 2:43PM

## 2020-11-24 NOTE — DATA REVIEWED
[de-identified] : \par EXAM: CT SINUSES\par \par \par PROCEDURE DATE: 11/02/2020\par \par \par \par INTERPRETATION: STUDY: CT SINUS WITHOUT CONTRAST.\par \par CLINICAL INDICATION: Sinusitis. Left orbital cellulitis. Recurrent left orbital cellulitis.\par \par TECHNIQUE: Axial noncontrast CT scans of the paranasal sinuses were performed. Sagittal and coronal reconstructions were obtained. Dose optimization techniques were utilized including kVp/mA modulation (along with iterative reconstructions).\par \par COMPARISON: CT maxillofacial 12/6/2019., CT maxillofacial 10/14/2020\par \par FINDINGS:\par There is a persistent defect along the medial left inferior orbital wall and left lamina preparation consistent with known chronic orbital wall fractures. There is interval decreased opacification within the underlying left maxillary sinus. There is interval resolution of extension of infectious or inflammatory debris into the inferior extraconal space through the large inferior orbital wall defect. There is resolution of left inferior periorbital preseptal soft tissue swelling. There is no significant residual left post septal orbital fat stranding. There is persistent plaque-like's thin soft tissue thickening along the floor of the left orbit which is similar in appearance when compared to the study from 12/6/2019 which likely represents granulation or scar tissue. The globes are symmetric and intact. Extraocular muscles and optic nerve sheath complexes are unremarkable.\par \par The frontal sinuses to be well developed and well aerated.\par \par The ethmoid sinuses are well developed.There is no significant mucosal abnormality.\par \par The sphenoid sinuses are well developed and normal.\par \par \par The maxillary sinuses are symmetrically developed. There are several prominent polyps and moderate polypoid mucosal thickening within the left maxillary sinus. There is persistent obstruction of the left ostiomeatal unit secondary to persistent mass effect related to the overlying left inferior and medial orbital wall defects.\par \par There is a right-sided shiela bullosa. There is leftward nasal septal deviation..\par \par The nasal bones are intact. The mandible, maxilla, zygoma and pterygoid plates are intact.\par \par The visualized intracranial contents are grossly normal. The orbital soft tissues are grossly normal. The visualized soft tissues under the skull base are grossly normal.\par \par There are no fractures.\par \par IMPRESSION:\par There is resolved total opacification of the left maxillary sinus with extension of infectious or inflammatory debris into the left orbit. There is resolved left periorbital soft tissue swelling and left intraorbital fat stranding consistent with marked improvement or resolution of orbital cellulitis.\par \par \par \par \par \par \par \par \par NAOMIE MCKEON M.D., ATTENDING RADIOLOGIST\par This document has been electronically signed. Nov 2 2020 1:41PM\par

## 2020-11-24 NOTE — REASON FOR VISIT
[Subsequent Evaluation] : a subsequent evaluation for [FreeTextEntry2] : follow up Recurrent L-sided orbital cellulitis.

## 2020-11-24 NOTE — HISTORY OF PRESENT ILLNESS
[de-identified] : 25M with recurrent L-sided orbital cellulitis presents for follow-up\par LCV 10/27/2020\par Repeat CT scan demonstrates marked improvement of L maxillary sinusitis but still with significant opacification-- there is resolution of inflammation/soft tissue in the L orbit\par Planned to see Dr. Alva Anton of oculoplastics on 12/1\par Symptomatically with some improvement in L-eye pain as compared to prior\par No other significant changes in symptoms

## 2020-11-24 NOTE — PHYSICAL EXAM
[Nasal Endoscopy Performed] : nasal endoscopy was performed, see procedure section for findings [Midline] : trachea located in midline position [Normal] : orientation to person, place, and time: normal [de-identified] : L hypoglobus

## 2020-12-22 ENCOUNTER — APPOINTMENT (OUTPATIENT)
Dept: OTOLARYNGOLOGY | Facility: CLINIC | Age: 25
End: 2020-12-22
Payer: COMMERCIAL

## 2020-12-22 VITALS
SYSTOLIC BLOOD PRESSURE: 140 MMHG | DIASTOLIC BLOOD PRESSURE: 90 MMHG | TEMPERATURE: 98 F | HEIGHT: 68 IN | WEIGHT: 210 LBS | BODY MASS INDEX: 31.83 KG/M2

## 2020-12-22 PROCEDURE — 99213 OFFICE O/P EST LOW 20 MIN: CPT | Mod: 25

## 2020-12-22 PROCEDURE — 99072 ADDL SUPL MATRL&STAF TM PHE: CPT

## 2020-12-22 PROCEDURE — 31231 NASAL ENDOSCOPY DX: CPT

## 2020-12-28 NOTE — PHYSICAL EXAM
[Nasal Endoscopy Performed] : nasal endoscopy was performed, see procedure section for findings [Normal] : no rashes [de-identified] : L hypoglobus

## 2020-12-28 NOTE — HISTORY OF PRESENT ILLNESS
[de-identified] : 25M with recurrent L-sided orbital cellulitis presents for follow-up\par LCV 11/19/20\par Recently saw Alva Anton of oculoplastics\par Feels well\par Denies sinonasal or orbital symptoms

## 2021-06-11 NOTE — ED PROVIDER NOTE - CARE PLAN
Principal Discharge DX:	Periorbital pain, left  Secondary Diagnosis:	Sinusitis, unspecified chronicity, unspecified location   Patient expressed no known problems or needs

## 2022-01-03 ENCOUNTER — OUTPATIENT (OUTPATIENT)
Dept: OUTPATIENT SERVICES | Facility: HOSPITAL | Age: 27
LOS: 1 days | End: 2022-01-03
Payer: COMMERCIAL

## 2022-01-03 DIAGNOSIS — Z98.890 OTHER SPECIFIED POSTPROCEDURAL STATES: Chronic | ICD-10-CM

## 2022-01-03 DIAGNOSIS — Z20.828 CONTACT WITH AND (SUSPECTED) EXPOSURE TO OTHER VIRAL COMMUNICABLE DISEASES: ICD-10-CM

## 2022-01-03 LAB — SARS-COV-2 RNA SPEC QL NAA+PROBE: SIGNIFICANT CHANGE UP

## 2022-01-03 PROCEDURE — U0005: CPT

## 2022-01-03 PROCEDURE — C9803: CPT

## 2022-01-03 PROCEDURE — U0003: CPT

## 2022-01-04 DIAGNOSIS — Z20.828 CONTACT WITH AND (SUSPECTED) EXPOSURE TO OTHER VIRAL COMMUNICABLE DISEASES: ICD-10-CM

## 2022-02-26 ENCOUNTER — EMERGENCY (EMERGENCY)
Facility: HOSPITAL | Age: 27
LOS: 0 days | Discharge: HOME | End: 2022-02-26
Attending: EMERGENCY MEDICINE
Payer: COMMERCIAL

## 2022-02-26 VITALS
TEMPERATURE: 99 F | DIASTOLIC BLOOD PRESSURE: 95 MMHG | HEART RATE: 81 BPM | HEIGHT: 68 IN | OXYGEN SATURATION: 97 % | RESPIRATION RATE: 18 BRPM | SYSTOLIC BLOOD PRESSURE: 133 MMHG

## 2022-02-26 DIAGNOSIS — R07.81 PLEURODYNIA: ICD-10-CM

## 2022-02-26 DIAGNOSIS — R35.0 FREQUENCY OF MICTURITION: ICD-10-CM

## 2022-02-26 DIAGNOSIS — M54.9 DORSALGIA, UNSPECIFIED: ICD-10-CM

## 2022-02-26 DIAGNOSIS — Z98.890 OTHER SPECIFIED POSTPROCEDURAL STATES: Chronic | ICD-10-CM

## 2022-02-26 DIAGNOSIS — I10 ESSENTIAL (PRIMARY) HYPERTENSION: ICD-10-CM

## 2022-02-26 DIAGNOSIS — M25.511 PAIN IN RIGHT SHOULDER: ICD-10-CM

## 2022-02-26 DIAGNOSIS — R11.0 NAUSEA: ICD-10-CM

## 2022-02-26 DIAGNOSIS — K76.0 FATTY (CHANGE OF) LIVER, NOT ELSEWHERE CLASSIFIED: ICD-10-CM

## 2022-02-26 LAB
ALBUMIN SERPL ELPH-MCNC: 4.1 G/DL — SIGNIFICANT CHANGE UP (ref 3.3–5)
ALP SERPL-CCNC: 95 U/L — SIGNIFICANT CHANGE UP (ref 40–120)
ALT FLD-CCNC: 133 U/L — HIGH (ref 12–78)
ANION GAP SERPL CALC-SCNC: 6 MMOL/L — SIGNIFICANT CHANGE UP (ref 5–17)
APPEARANCE UR: CLEAR — SIGNIFICANT CHANGE UP
AST SERPL-CCNC: 66 U/L — HIGH (ref 15–37)
BASOPHILS # BLD AUTO: 0.08 K/UL — SIGNIFICANT CHANGE UP (ref 0–0.2)
BASOPHILS NFR BLD AUTO: 0.5 % — SIGNIFICANT CHANGE UP (ref 0–2)
BILIRUB SERPL-MCNC: 0.4 MG/DL — SIGNIFICANT CHANGE UP (ref 0.2–1.2)
BILIRUB UR-MCNC: NEGATIVE — SIGNIFICANT CHANGE UP
BUN SERPL-MCNC: 10 MG/DL — SIGNIFICANT CHANGE UP (ref 7–23)
CALCIUM SERPL-MCNC: 9.5 MG/DL — SIGNIFICANT CHANGE UP (ref 8.5–10.1)
CHLORIDE SERPL-SCNC: 108 MMOL/L — SIGNIFICANT CHANGE UP (ref 96–108)
CO2 SERPL-SCNC: 26 MMOL/L — SIGNIFICANT CHANGE UP (ref 22–31)
COLOR SPEC: YELLOW — SIGNIFICANT CHANGE UP
CREAT SERPL-MCNC: 0.8 MG/DL — SIGNIFICANT CHANGE UP (ref 0.5–1.3)
DIFF PNL FLD: NEGATIVE — SIGNIFICANT CHANGE UP
EOSINOPHIL # BLD AUTO: 0.17 K/UL — SIGNIFICANT CHANGE UP (ref 0–0.5)
EOSINOPHIL NFR BLD AUTO: 1.2 % — SIGNIFICANT CHANGE UP (ref 0–6)
GLUCOSE SERPL-MCNC: 101 MG/DL — HIGH (ref 70–99)
GLUCOSE UR QL: NEGATIVE — SIGNIFICANT CHANGE UP
HCT VFR BLD CALC: 44.3 % — SIGNIFICANT CHANGE UP (ref 39–50)
HGB BLD-MCNC: 14.9 G/DL — SIGNIFICANT CHANGE UP (ref 13–17)
IMM GRANULOCYTES NFR BLD AUTO: 0.3 % — SIGNIFICANT CHANGE UP (ref 0–1.5)
KETONES UR-MCNC: NEGATIVE — SIGNIFICANT CHANGE UP
LEUKOCYTE ESTERASE UR-ACNC: NEGATIVE — SIGNIFICANT CHANGE UP
LYMPHOCYTES # BLD AUTO: 23.6 % — SIGNIFICANT CHANGE UP (ref 13–44)
LYMPHOCYTES # BLD AUTO: 3.44 K/UL — HIGH (ref 1–3.3)
MCHC RBC-ENTMCNC: 30.1 PG — SIGNIFICANT CHANGE UP (ref 27–34)
MCHC RBC-ENTMCNC: 33.6 GM/DL — SIGNIFICANT CHANGE UP (ref 32–36)
MCV RBC AUTO: 89.5 FL — SIGNIFICANT CHANGE UP (ref 80–100)
MONOCYTES # BLD AUTO: 1.03 K/UL — HIGH (ref 0–0.9)
MONOCYTES NFR BLD AUTO: 7.1 % — SIGNIFICANT CHANGE UP (ref 2–14)
NEUTROPHILS # BLD AUTO: 9.81 K/UL — HIGH (ref 1.8–7.4)
NEUTROPHILS NFR BLD AUTO: 67.3 % — SIGNIFICANT CHANGE UP (ref 43–77)
NITRITE UR-MCNC: NEGATIVE — SIGNIFICANT CHANGE UP
PH UR: 5 — SIGNIFICANT CHANGE UP (ref 5–8)
PLATELET # BLD AUTO: 435 K/UL — HIGH (ref 150–400)
POTASSIUM SERPL-MCNC: 3.7 MMOL/L — SIGNIFICANT CHANGE UP (ref 3.5–5.3)
POTASSIUM SERPL-SCNC: 3.7 MMOL/L — SIGNIFICANT CHANGE UP (ref 3.5–5.3)
PROT SERPL-MCNC: 8.8 GM/DL — HIGH (ref 6–8.3)
PROT UR-MCNC: NEGATIVE — SIGNIFICANT CHANGE UP
RBC # BLD: 4.95 M/UL — SIGNIFICANT CHANGE UP (ref 4.2–5.8)
RBC # FLD: 12.3 % — SIGNIFICANT CHANGE UP (ref 10.3–14.5)
SODIUM SERPL-SCNC: 140 MMOL/L — SIGNIFICANT CHANGE UP (ref 135–145)
SP GR SPEC: 1.01 — SIGNIFICANT CHANGE UP (ref 1.01–1.02)
UROBILINOGEN FLD QL: NEGATIVE — SIGNIFICANT CHANGE UP
WBC # BLD: 14.58 K/UL — HIGH (ref 3.8–10.5)
WBC # FLD AUTO: 14.58 K/UL — HIGH (ref 3.8–10.5)

## 2022-02-26 PROCEDURE — 74176 CT ABD & PELVIS W/O CONTRAST: CPT | Mod: 26,MA

## 2022-02-26 PROCEDURE — 85025 COMPLETE CBC W/AUTO DIFF WBC: CPT

## 2022-02-26 PROCEDURE — 36415 COLL VENOUS BLD VENIPUNCTURE: CPT

## 2022-02-26 PROCEDURE — 81003 URINALYSIS AUTO W/O SCOPE: CPT

## 2022-02-26 PROCEDURE — 96374 THER/PROPH/DIAG INJ IV PUSH: CPT

## 2022-02-26 PROCEDURE — 74176 CT ABD & PELVIS W/O CONTRAST: CPT | Mod: MA

## 2022-02-26 PROCEDURE — 99284 EMERGENCY DEPT VISIT MOD MDM: CPT | Mod: 25

## 2022-02-26 PROCEDURE — 80053 COMPREHEN METABOLIC PANEL: CPT

## 2022-02-26 PROCEDURE — 99285 EMERGENCY DEPT VISIT HI MDM: CPT

## 2022-02-26 PROCEDURE — 76705 ECHO EXAM OF ABDOMEN: CPT

## 2022-02-26 PROCEDURE — 76705 ECHO EXAM OF ABDOMEN: CPT | Mod: 26

## 2022-02-26 PROCEDURE — 87086 URINE CULTURE/COLONY COUNT: CPT

## 2022-02-26 RX ORDER — KETOROLAC TROMETHAMINE 30 MG/ML
30 SYRINGE (ML) INJECTION ONCE
Refills: 0 | Status: DISCONTINUED | OUTPATIENT
Start: 2022-02-26 | End: 2022-02-26

## 2022-02-26 RX ORDER — IBUPROFEN 200 MG
1 TABLET ORAL
Qty: 20 | Refills: 0
Start: 2022-02-26 | End: 2022-03-02

## 2022-02-26 RX ORDER — METHOCARBAMOL 500 MG/1
2 TABLET, FILM COATED ORAL
Qty: 24 | Refills: 0
Start: 2022-02-26 | End: 2022-02-28

## 2022-02-26 RX ORDER — LIDOCAINE 4 G/100G
1 CREAM TOPICAL ONCE
Refills: 0 | Status: COMPLETED | OUTPATIENT
Start: 2022-02-26 | End: 2022-02-26

## 2022-02-26 RX ADMIN — LIDOCAINE 1 PATCH: 4 CREAM TOPICAL at 17:56

## 2022-02-26 RX ADMIN — Medication 30 MILLIGRAM(S): at 17:56

## 2022-02-26 NOTE — ED STATDOCS - PATIENT PORTAL LINK FT
You can access the FollowMyHealth Patient Portal offered by [facility] by registering at the following website: [email or web address]. By joining [employer]’s FollowMyHealth portal, you will also be able to view your health information using other applications (apps) compatible with our system.

## 2022-02-26 NOTE — ED STATDOCS - CLINICAL SUMMARY MEDICAL DECISION MAKING FREE TEXT BOX
26 male with right flank pain. likely msk. will rule out stone. Reassess. 26 male with right flank pain. likely msk. will rule out stone. no midline pain or redflags. Reassess.

## 2022-02-26 NOTE — ED STATDOCS - NS ED ROS FT
Constitutional: No fever or chills  Eyes: No visual changes  HEENT: No throat pain  CV: No chest pain  Resp: No SOB no cough  GI: No abd pain, +nausea, no vomiting, +soft stools  : No dysuria, +frequent urination  MSK: +back pain, +rib pain, +flank pain  Skin: No rash  Neuro: No headache

## 2022-02-26 NOTE — ED STATDOCS - OBJECTIVE STATEMENT
26 year old male with PMHx of HTN, TBI presents to the ED c/o lower rib cage pain radiating to lower back and intermittently radiating to right flank x 1 week. Also with frequent urination, nausea this morning, and soft stools. States that the pain is intermittent and changes in intensity with change in position. Denies numbness in legs, fever, bloody urine, Tylenol provides some relief. Non smoker. NKDA. No other injuries or complaints.

## 2022-02-26 NOTE — ED STATDOCS - PROGRESS NOTE DETAILS
Results reviewed and discussed with pt, non specific leukocytosis likely 2/2 mometasone use. Pt previously aware of fatty liver, counseled on dietary modification. Suspect msk strain. Discussed importance of close FU with PMD. Pt asked to return to ED immediately for any new or concerning sx or worsening. Pt acknowledges and understands plan -Murray Richardson PA-C 25 y/o M presents w/ back pain x 1 week. Pt reports pain below R shoulder pain radiating to lower back, pain is worse with movement and improved when laying down. Pt reports more frequent urination but has also been drinking more water.   Back: No midline tenderness. Pain improved with palpation to R thoracolumbar paraspinal muscles. UE and LE muscle strength equal B/L. abd:soft, nd, nttp. -Murray Richardson PA-C

## 2022-02-26 NOTE — ED STATDOCS - NS_ ATTENDINGSCRIBEDETAILS _ED_A_ED_FT
I, Silas Leija MD,  performed the initial face to face bedside interview with this patient regarding history of present illness, review of symptoms and relevant past medical, social and family history.  I completed an independent physical examination.  I was the initial provider who evaluated this patient.   I personally saw the patient and performed a substantive portion of the visit including all aspects of the medical decision making.  The history, relevant review of systems, past medical and surgical history, medical decision making, and physical examination was documented by the scribe in my presence and I attest to the accuracy of the documentation.

## 2022-02-26 NOTE — ED STATDOCS - PRINCIPAL DIAGNOSIS
Bed: ED15  Expected date: 8/6/21  Expected time:   Means of arrival: Ambulance  Comments:  Charisse Sams   Back pain

## 2022-02-26 NOTE — ED STATDOCS - PHYSICAL EXAMINATION
Constitutional: NAD AAOx3  Eyes: PERRLA EOMI  Head: Normocephalic atraumatic  Mouth: MMM  Cardiac: regular rate   Resp: Lungs CTAB  GI: Abd s/nt/nd  Neuro: CN2-12 intact  MSK: no midline spinal TTP, mild right CVAT, negative Wright, McBurney's, no saddle anesthesia  Skin: No rashes Constitutional: NAD AAOx3  Eyes: PERRLA EOMI  Head: Normocephalic atraumatic  Mouth: MMM  Cardiac: regular rate   Resp: Lungs CTAB  GI: Abd s/nt/nd negative Wright, McBurney's,   Neuro: CN2-12 intact  MSK: no midline spinal TTP, mild right CVAT, no saddle anesthesia  Skin: No rashes

## 2022-02-26 NOTE — ED STATDOCS - ATTENDING CONTRIBUTION TO CARE
I, Silas Leija MD, personally saw the patient with ACP.  I have personally performed a face to face diagnostic evaluation on this patient.  I have reviewed the ACP note and agree with the history, exam, and plan of care, except as noted.   The initial assessment was performed by myself and then the patient was handed off to the ACP. The patient was followed and re-evaluated by the ACP. All labs, imaging and procedures were evaluated and performed by the ACP and I was available for consultation if any questions in the patients care came up.

## 2022-02-26 NOTE — ED STATDOCS - CARE PROVIDER_API CALL
Chava Jones)  Gastroenterology; Internal Medicine  [address] [place] [address]  Phone: [phone]  Fax: [phone]  Follow Up Time:

## 2022-02-26 NOTE — ED STATDOCS - NSFOLLOWUPINSTRUCTIONS_ED_ALL_ED_FT
Please follow up with your Primary MD in 1-2 days. Return to ED immediately for any new or concerning symptoms or worsening symptoms  Back Pain    WHAT YOU NEED TO KNOW:    Back pain is common. It can be caused by many conditions, such as arthritis or the breakdown of spinal discs. Your risk for back pain is increased by injuries, lack of activity, or repeated bending and twisting. You may feel sore or stiff on one or both sides of your back. The pain may spread to your buttocks or thighs.    DISCHARGE INSTRUCTIONS:    Return to the emergency department if:     You have pain, numbness, or weakness in one or both legs.      Your pain becomes so severe that you cannot walk.      You cannot control your urine or bowel movements.      You have severe back pain with chest pain.      You have severe back pain, nausea, and vomiting.      You have severe back pain that spreads to your side or genital area.    Contact your healthcare provider if:     You have back pain that does not get better with rest and pain medicine.      You have a fever.      You have pain that worsens when you are on your back or when you rest.      You have pain that worsens when you cough or sneeze.      You lose weight without trying.      You have questions or concerns about your condition or care.    Medicines:     NSAIDs help decrease swelling and pain. This medicine is available with or without a doctor's order. NSAIDs can cause stomach bleeding or kidney problems in certain people. If you take blood thinner medicine, always ask your healthcare provider if NSAIDs are safe for you. Always read the medicine label and follow directions.      Acetaminophen decreases pain and fever. It is available without a doctor's order. Ask how much to take and how often to take it. Follow directions. Read the labels of all other medicines you are using to see if they also contain acetaminophen, or ask your doctor or pharmacist. Acetaminophen can cause liver damage if not taken correctly. Do not use more than 4 grams (4,000 milligrams) total of acetaminophen in one day.       Muscle relaxers help decrease muscle spasms and back pain.      Prescription pain medicine may be given. Ask your healthcare provider how to take this medicine safely. Some prescription pain medicines contain acetaminophen. Do not take other medicines that contain acetaminophen without talking to your healthcare provider. Too much acetaminophen may cause liver damage. Prescription pain medicine may cause constipation. Ask your healthcare provider how to prevent or treat constipation.       Take your medicine as directed. Contact your healthcare provider if you think your medicine is not helping or if you have side effects. Tell him or her if you are allergic to any medicine. Keep a list of the medicines, vitamins, and herbs you take. Include the amounts, and when and why you take them. Bring the list or the pill bottles to follow-up visits. Carry your medicine list with you in case of an emergency.    How to manage your back pain:     Apply ice on your back for 15 to 20 minutes every hour or as directed. Use an ice pack, or put crushed ice in a plastic bag. Cover it with a towel before you apply it to your skin. Ice helps prevent tissue damage and decreases pain.      Apply heat on your back for 20 to 30 minutes every 2 hours for as many days as directed. Heat helps decrease pain and muscle spasms.      Stay active as much as you can without causing more pain. Bed rest could make your back pain worse. Avoid heavy lifting until your pain is gone.      Go to physical therapy as directed. A physical therapist can teach you exercises to help improve movement and strength, and to decrease pain.    Follow up with your healthcare provider in 2 weeks, or as directed: Write down your questions so you remember to ask them during your visits.    NON-ALCOHOLIC FATTY LIVER DISEASE - General Information           Non-Alcoholic Fatty Liver Disease    WHAT YOU NEED TO KNOW:    What is non-alcoholic fatty liver disease? Non-alcoholic fatty liver disease (NAFLD) is a buildup of fat in your liver from a condition other than alcohol abuse. NAFLD usually does not cause symptoms. You may have pain in the upper right side of your abdomen or feel tired.    Abdominal Organs         What increases my risk for NAFLD?   •Health conditions such as obesity, high cholesterol, metabolic syndrome, type 2 diabetes, or hepatitis      •Certain medicines, such as chemotherapy, antibiotics, and heart medicines      •Exposure to chemicals that are toxic to the liver, such as pesticides, toluene, or vinyl chloride      •IV nutrition with total parenteral nutrition (TPN) for longer than 6 weeks      How is NAFLD diagnosed? Your healthcare provider will ask about your medical conditions. He or she may ask about your past and current medicines. He or she will also check for other conditions that may be causing your symptoms. You may need any of the following:  •Blood tests will show how well your liver is working. They may also be used to get information about your overall health and find the cause of your NAFLD.      •An ultrasound uses sound waves to show pictures on a monitor. An ultrasound may be done to show if you have increased fat in your liver.      •A CT or MRI scan may be used to take pictures of your liver. You may be given contrast liquid to help healthcare providers see your liver better. Tell the healthcare provider if you have ever had an allergic reaction to contrast liquid. Do not enter the MRI room with anything metal. Metal can cause serious injury. Tell the healthcare provider if you have any metal in or on your body.      •A liver biopsy is a procedure to remove a small piece of your liver to be tested.      How is NAFLD treated? Medicine is usually not used to treat NAFLD. Medicine may be used to treat other health conditions and manage blood sugar or cholesterol levels. Your medicine may be changed if it is causing your NAFLD.    How can I manage NAFLD?   •Maintain a healthy weight. Ask your healthcare provider what a healthy weight is for you. Ask him or her to help you create a weight loss plan if you are overweight.      •Exercise as directed. Aerobic exercise 3 times a week for 20 to 45 minutes can help decrease fat buildup in your liver. Examples are cycling, brisk walking, or jogging. Ask your healthcare provider about the best exercise plan for you.  [employer] Walking for Exercise           •Eat a variety of healthy foods. Healthy foods include vegetables, fruit, whole-grain breads, low-fat dairy products, beans, lean meats, and fish. Foods low in simple carbohydrates, high-fructose corn syrup, and trans fat may help decrease fat buildup in your liver.  Healthy Foods           •Do not drink alcohol. Alcohol may make NAFLD worse and harm your liver. Ask your healthcare provider for information if you need help to quit drinking.      When should I seek immediate care?   •You have shortness of breath.      •You have trouble thinking clearly or are confused.      •You feel lightheaded or faint.      •You have shaking, chills, and a fever.      When should I call my doctor?   •You have more pain or swelling in your abdomen.      •You feel more tired than usual.      •You bruise or bleed easily.      •Your skin or the whites of your eyes look yellow.      •You have questions or concerns about your condition or care.      CARE AGREEMENT:    You have the right to help plan your care. Learn about your health condition and how it may be treated. Discuss treatment options with your healthcare providers to decide what care you want to receive. You always have the right to refuse treatment.

## 2022-02-26 NOTE — ED ADULT NURSE NOTE - OBJECTIVE STATEMENT
Patient c/o right back and right flank pain. Pain started 1 week ago but this AM pain was accompanied by nausea so patient came to ED to be evaluated. Patient color good. States frequent but not painful urination.

## 2022-02-27 LAB
CULTURE RESULTS: SIGNIFICANT CHANGE UP
SPECIMEN SOURCE: SIGNIFICANT CHANGE UP

## 2022-08-07 NOTE — ED CDU PROVIDER SUBSEQUENT DAY NOTE - HISTORY
25yM w/pmhx HTN, left orbital floor fracture in 2017 s/p repair with mesh presenting transferred to [facility] with left orbital cellulitis. Pt reports pain and swelling around left eye began 2 days ago. Pt seen by ENT, recommending IV Clindamycin, decadron 10mg x 1 dose, flonase and afrin BID, sinus rinses Q4 hours. Pt was evaluated by optho, no acute interventions, recommend outpt follow up in clinic. Low Risk (score 7-11)

## 2022-08-10 NOTE — ED STATDOCS - ENMT, MLM
Patient would like to discuss lab results for A1c Nasal mucosa clear.  Mouth with normal mucosa  Throat has no vesicles, no oropharyngeal exudates and uvula is midline. Nasal mucosa with few flecks of clotted blood in L nare, no active bleeding or source of bleeding visualized.  Mouth with normal mucosa  Throat has no vesicles, no oropharyngeal exudates and uvula is midline.

## 2022-08-18 ENCOUNTER — APPOINTMENT (OUTPATIENT)
Dept: OTOLARYNGOLOGY | Facility: CLINIC | Age: 27
End: 2022-08-18

## 2022-08-18 VITALS
HEART RATE: 78 BPM | HEIGHT: 68 IN | SYSTOLIC BLOOD PRESSURE: 135 MMHG | DIASTOLIC BLOOD PRESSURE: 91 MMHG | BODY MASS INDEX: 31.83 KG/M2 | OXYGEN SATURATION: 98 % | WEIGHT: 210 LBS

## 2022-08-18 DIAGNOSIS — H05.012 CELLULITIS OF LEFT ORBIT: ICD-10-CM

## 2022-08-18 PROCEDURE — 31231 NASAL ENDOSCOPY DX: CPT

## 2022-08-18 PROCEDURE — 99213 OFFICE O/P EST LOW 20 MIN: CPT | Mod: 25

## 2022-08-18 RX ORDER — AMOXICILLIN AND CLAVULANATE POTASSIUM 875; 125 MG/1; MG/1
875-125 TABLET, COATED ORAL
Qty: 14 | Refills: 2 | Status: COMPLETED | COMMUNITY
Start: 2020-11-03 | End: 2022-08-18

## 2022-08-18 NOTE — PHYSICAL EXAM
[Nasal Endoscopy Performed] : nasal endoscopy was performed, see procedure section for findings [Midline] : trachea located in midline position [Normal] : no rashes [de-identified] : L hypoglobus

## 2022-09-10 ENCOUNTER — APPOINTMENT (OUTPATIENT)
Dept: CT IMAGING | Facility: CLINIC | Age: 27
End: 2022-09-10

## 2022-09-10 ENCOUNTER — OUTPATIENT (OUTPATIENT)
Dept: OUTPATIENT SERVICES | Facility: HOSPITAL | Age: 27
LOS: 1 days | End: 2022-09-10
Payer: COMMERCIAL

## 2022-09-10 DIAGNOSIS — Z98.890 OTHER SPECIFIED POSTPROCEDURAL STATES: Chronic | ICD-10-CM

## 2022-09-10 DIAGNOSIS — J32.9 CHRONIC SINUSITIS, UNSPECIFIED: ICD-10-CM

## 2022-09-10 PROCEDURE — 70486 CT MAXILLOFACIAL W/O DYE: CPT

## 2022-09-10 PROCEDURE — 70486 CT MAXILLOFACIAL W/O DYE: CPT | Mod: 26

## 2022-09-30 ENCOUNTER — APPOINTMENT (OUTPATIENT)
Dept: OTOLARYNGOLOGY | Facility: CLINIC | Age: 27
End: 2022-09-30

## 2022-09-30 VITALS
DIASTOLIC BLOOD PRESSURE: 86 MMHG | BODY MASS INDEX: 31.83 KG/M2 | HEIGHT: 68 IN | SYSTOLIC BLOOD PRESSURE: 128 MMHG | TEMPERATURE: 97.6 F | HEART RATE: 80 BPM | WEIGHT: 210 LBS

## 2022-09-30 PROCEDURE — 99213 OFFICE O/P EST LOW 20 MIN: CPT | Mod: 25

## 2022-09-30 PROCEDURE — 31231 NASAL ENDOSCOPY DX: CPT

## 2022-09-30 NOTE — HISTORY OF PRESENT ILLNESS
[de-identified] : 26 year old male with hx of L orbital cellulitis presents for follow-up\par LCV 9/30/2022\par Feels better after PO abx/steroids\par Recent CT with L maxillary sinusitis, old floor fracture-- stable compared to CT in 11/2020\par He had consulted with Dr. Anton of oculoplastics\par Ultimately decided not to proceed given likely need for staged procedure\par Intermittent nasal congestion, L orbital pain and pressure-- stable compared to prior\par No episodes of eye swelling, visual acuity issues\par Poor sense of smell\par No recent sinus infections \par Still rinsing\par \par CT-- reviewed personally-- IMPRESSION: Overall, findings are stable compared with prior study dated 11/2/2020.\par 1. Persistent moderate left maxillary sinus mucosal thickening, which appears to obscure the infundibulum of the left ostiomeatal unit.\par 2. Findings related to nonacute left orbital floor fracture and fractures involving the left lamina papyracea.\par 3. No evidence of acute sinusitis. While assessment for an infectious process is limited on this noncontrast study, findings do not suggest the presence of post septal cellulitis.

## 2022-09-30 NOTE — PHYSICAL EXAM
[Nasal Endoscopy Performed] : nasal endoscopy was performed, see procedure section for findings [Midline] : trachea located in midline position [Normal] : no rashes [de-identified] : L enophthalmos

## 2022-11-07 ENCOUNTER — APPOINTMENT (OUTPATIENT)
Dept: OPHTHALMOLOGY | Facility: CLINIC | Age: 27
End: 2022-11-07

## 2022-11-07 ENCOUNTER — NON-APPOINTMENT (OUTPATIENT)
Age: 27
End: 2022-11-07

## 2022-11-07 PROCEDURE — 92285 EXTERNAL OCULAR PHOTOGRAPHY: CPT

## 2022-11-07 PROCEDURE — 99214 OFFICE O/P EST MOD 30 MIN: CPT

## 2022-11-21 ENCOUNTER — APPOINTMENT (OUTPATIENT)
Dept: OPHTHALMOLOGY | Facility: CLINIC | Age: 27
End: 2022-11-21

## 2023-01-12 ENCOUNTER — APPOINTMENT (OUTPATIENT)
Dept: OTOLARYNGOLOGY | Facility: CLINIC | Age: 28
End: 2023-01-12

## 2023-03-10 ENCOUNTER — APPOINTMENT (OUTPATIENT)
Dept: OTOLARYNGOLOGY | Facility: CLINIC | Age: 28
End: 2023-03-10
Payer: COMMERCIAL

## 2023-03-10 VITALS
WEIGHT: 215 LBS | OXYGEN SATURATION: 98 % | SYSTOLIC BLOOD PRESSURE: 122 MMHG | DIASTOLIC BLOOD PRESSURE: 72 MMHG | HEART RATE: 87 BPM | BODY MASS INDEX: 32.58 KG/M2 | TEMPERATURE: 97.5 F | HEIGHT: 68 IN

## 2023-03-10 DIAGNOSIS — J01.90 ACUTE SINUSITIS, UNSPECIFIED: ICD-10-CM

## 2023-03-10 PROCEDURE — 31231 NASAL ENDOSCOPY DX: CPT

## 2023-03-10 PROCEDURE — 99214 OFFICE O/P EST MOD 30 MIN: CPT | Mod: 25

## 2023-03-10 NOTE — HISTORY OF PRESENT ILLNESS
[de-identified] : 27M with recurrent L orbital cellulitis in setting of prior orbital floor fx presents for follow-up\par LCV 9/30/22\par Saw Dr. Atkinson from ophtho (note reviewed) who discussed and recommended elective plate removal\par No issues with orbital swelling since last being seen\par Increased nasal congestion, PND, nasal discomfort\par No mike purulence

## 2023-03-10 NOTE — PHYSICAL EXAM
[de-identified] : wax [Nasal Endoscopy Performed] : nasal endoscopy was performed, see procedure section for findings [Midline] : trachea located in midline position [Normal] : no rashes

## 2023-08-29 NOTE — ED ADULT TRIAGE NOTE - PAIN: PRESENCE, MLM
Please allow patient to remain out of work until 9/5/23 due to left foot surgery    Zach Petty DPM complains of pain/discomfort

## 2023-09-29 ENCOUNTER — APPOINTMENT (OUTPATIENT)
Dept: OTOLARYNGOLOGY | Facility: CLINIC | Age: 28
End: 2023-09-29

## 2023-10-26 ENCOUNTER — APPOINTMENT (OUTPATIENT)
Dept: OTOLARYNGOLOGY | Facility: CLINIC | Age: 28
End: 2023-10-26

## 2023-11-27 NOTE — ED ADULT NURSE NOTE - NS_SISCREENINGSR_GEN_ALL_ED
The medication list included in this document is our record of what you are currently taking, including any changes that were made at today's visit. If you find any differences when compared to your medications at home, or have any questions that were not answered at your visit, please contact the office. See your breast surgeon about your breast pain.
Negative

## 2023-12-05 ENCOUNTER — APPOINTMENT (OUTPATIENT)
Dept: OTOLARYNGOLOGY | Facility: CLINIC | Age: 28
End: 2023-12-05
Payer: COMMERCIAL

## 2023-12-05 VITALS
BODY MASS INDEX: 32.58 KG/M2 | SYSTOLIC BLOOD PRESSURE: 158 MMHG | WEIGHT: 215 LBS | DIASTOLIC BLOOD PRESSURE: 113 MMHG | HEART RATE: 93 BPM | HEIGHT: 68 IN | OXYGEN SATURATION: 98 %

## 2023-12-05 PROCEDURE — 99214 OFFICE O/P EST MOD 30 MIN: CPT | Mod: 25

## 2023-12-05 PROCEDURE — 31231 NASAL ENDOSCOPY DX: CPT

## 2024-03-26 ENCOUNTER — EMERGENCY (EMERGENCY)
Facility: HOSPITAL | Age: 29
LOS: 0 days | Discharge: HOME | End: 2024-03-27
Attending: EMERGENCY MEDICINE
Payer: COMMERCIAL

## 2024-03-26 VITALS — HEIGHT: 68 IN

## 2024-03-26 DIAGNOSIS — I10 ESSENTIAL (PRIMARY) HYPERTENSION: ICD-10-CM

## 2024-03-26 DIAGNOSIS — T44.7X6A UNDERDOSING OF BETA-ADRENORECEPTOR ANTAGONISTS, INITIAL ENCOUNTER: ICD-10-CM

## 2024-03-26 DIAGNOSIS — Z91.09 OTHER ALLERGY STATUS, OTHER THAN TO DRUGS AND BIOLOGICAL SUBSTANCES: ICD-10-CM

## 2024-03-26 DIAGNOSIS — Z91.120 PATIENT'S INTENTIONAL UNDERDOSING OF MEDICATION REGIMEN DUE TO FINANCIAL HARDSHIP: ICD-10-CM

## 2024-03-26 DIAGNOSIS — Z98.890 OTHER SPECIFIED POSTPROCEDURAL STATES: Chronic | ICD-10-CM

## 2024-03-26 DIAGNOSIS — R00.0 TACHYCARDIA, UNSPECIFIED: ICD-10-CM

## 2024-03-26 DIAGNOSIS — R25.3 FASCICULATION: ICD-10-CM

## 2024-03-26 DIAGNOSIS — T46.1X6A UNDERDOSING OF CALCIUM-CHANNEL BLOCKERS, INITIAL ENCOUNTER: ICD-10-CM

## 2024-03-26 PROCEDURE — 85025 COMPLETE CBC W/AUTO DIFF WBC: CPT

## 2024-03-26 PROCEDURE — 70450 CT HEAD/BRAIN W/O DYE: CPT | Mod: 26,MC

## 2024-03-26 PROCEDURE — 93005 ELECTROCARDIOGRAM TRACING: CPT

## 2024-03-26 PROCEDURE — 93010 ELECTROCARDIOGRAM REPORT: CPT

## 2024-03-26 PROCEDURE — 99285 EMERGENCY DEPT VISIT HI MDM: CPT

## 2024-03-26 PROCEDURE — 80053 COMPREHEN METABOLIC PANEL: CPT

## 2024-03-26 PROCEDURE — 36415 COLL VENOUS BLD VENIPUNCTURE: CPT

## 2024-03-26 PROCEDURE — 84484 ASSAY OF TROPONIN QUANT: CPT

## 2024-03-26 PROCEDURE — 99285 EMERGENCY DEPT VISIT HI MDM: CPT | Mod: 25

## 2024-03-26 PROCEDURE — 70450 CT HEAD/BRAIN W/O DYE: CPT | Mod: MC

## 2024-03-26 PROCEDURE — 81003 URINALYSIS AUTO W/O SCOPE: CPT

## 2024-03-26 RX ORDER — SODIUM CHLORIDE 9 MG/ML
3 INJECTION INTRAMUSCULAR; INTRAVENOUS; SUBCUTANEOUS ONCE
Refills: 0 | Status: COMPLETED | OUTPATIENT
Start: 2024-03-26 | End: 2024-03-26

## 2024-03-26 RX ADMIN — SODIUM CHLORIDE 3 MILLILITER(S): 9 INJECTION INTRAMUSCULAR; INTRAVENOUS; SUBCUTANEOUS at 23:29

## 2024-03-26 NOTE — ED ADULT NURSE NOTE - OBJECTIVE STATEMENT
Pt presents to ED c/o hypertension./ Pt states he has pmh of HTN but has not been able to be compliant with his medications for the past few days due to not having access to his medications because of insurance issues. Pt states he was at urgent care for "another issue" and when they took his BP it was 170/137, prompting urgent care to send him to ED. Pt denies any CP, SOB, dizziness, or any other complaints at this time. Upon initial RN assessment, pt BP was 160/118. Pt is awake and alert, ambulatory, acting appropriate not acute s/s of distress noted at this time.

## 2024-03-26 NOTE — ED PROVIDER NOTE - NSFOLLOWUPINSTRUCTIONS_ED_ALL_ED_FT
Take your BP meds as prescribed: Amlodipine daily, Carvedilol 1 pill 2 x a day.  Follow up this week with yo9ur primary doctor for BP re-check & to work out BP meds scripts.      Hypertension, Adult  High blood pressure (hypertension) is when the force of blood pumping through the arteries is too strong. The arteries are the blood vessels that carry blood from the heart throughout the body. Hypertension forces the heart to work harder to pump blood and may cause arteries to become narrow or stiff. Untreated or uncontrolled hypertension can lead to a heart attack, heart failure, a stroke, kidney disease, and other problems.    A blood pressure reading consists of a higher number over a lower number. Ideally, your blood pressure should be below 120/80. The first ("top") number is called the systolic pressure. It is a measure of the pressure in your arteries as your heart beats. The second ("bottom") number is called the diastolic pressure. It is a measure of the pressure in your arteries as the heart relaxes.    What are the causes?  The exact cause of this condition is not known. There are some conditions that result in high blood pressure.    What increases the risk?  Certain factors may make you more likely to develop high blood pressure. Some of these risk factors are under your control, including:  Smoking.  Not getting enough exercise or physical activity.  Being overweight.  Having too much fat, sugar, calories, or salt (sodium) in your diet.  Drinking too much alcohol.  Other risk factors include:  Having a personal history of heart disease, diabetes, high cholesterol, or kidney disease.  Stress.  Having a family history of high blood pressure and high cholesterol.  Having obstructive sleep apnea.  Age. The risk increases with age.  What are the signs or symptoms?  High blood pressure may not cause symptoms. Very high blood pressure (hypertensive crisis) may cause:  Headache.  Fast or irregular heartbeats (palpitations).  Shortness of breath.  Nosebleed.  Nausea and vomiting.  Vision changes.  Severe chest pain, dizziness, and seizures.  How is this diagnosed?  This condition is diagnosed by measuring your blood pressure while you are seated, with your arm resting on a flat surface, your legs uncrossed, and your feet flat on the floor. The cuff of the blood pressure monitor will be placed directly against the skin of your upper arm at the level of your heart. Blood pressure should be measured at least twice using the same arm. Certain conditions can cause a difference in blood pressure between your right and left arms.    If you have a high blood pressure reading during one visit or you have normal blood pressure with other risk factors, you may be asked to:  Return on a different day to have your blood pressure checked again.  Monitor your blood pressure at home for 1 week or longer.  If you are diagnosed with hypertension, you may have other blood or imaging tests to help your health care provider understand your overall risk for other conditions.    How is this treated?  This condition is treated by making healthy lifestyle changes, such as eating healthy foods, exercising more, and reducing your alcohol intake. You may be referred for counseling on a healthy diet and physical activity.    Your health care provider may prescribe medicine if lifestyle changes are not enough to get your blood pressure under control and if:  Your systolic blood pressure is above 130.  Your diastolic blood pressure is above 80.  Your personal target blood pressure may vary depending on your medical conditions, your age, and other factors.    Follow these instructions at home:  Eating and drinking    A plate with examples of foods in a healthy diet.  Eat a diet that is high in fiber and potassium, and low in sodium, added sugar, and fat. An example of this eating plan is called the DASH diet. DASH stands for Dietary Approaches to Stop Hypertension. To eat this way:  Eat plenty of fresh fruits and vegetables. Try to fill one half of your plate at each meal with fruits and vegetables.  Eat whole grains, such as whole-wheat pasta, brown rice, or whole-grain bread. Fill about one fourth of your plate with whole grains.  Eat or drink low-fat dairy products, such as skim milk or low-fat yogurt.  Avoid fatty cuts of meat, processed or cured meats, and poultry with skin. Fill about one fourth of your plate with lean proteins, such as fish, chicken without skin, beans, eggs, or tofu.  Avoid pre-made and processed foods. These tend to be higher in sodium, added sugar, and fat.  Reduce your daily sodium intake. Many people with hypertension should eat less than 1,500 mg of sodium a day.  Do not drink alcohol if:  Your health care provider tells you not to drink.  You are pregnant, may be pregnant, or are planning to become pregnant.  If you drink alcohol:  Limit how much you have to:  0–1 drink a day for women.  0–2 drinks a day for men.  Know how much alcohol is in your drink. In the [place] one drink equals one 12 oz bottle of beer (355 mL), one 5 oz glass of wine (148 mL), or one 1½ oz glass of hard liquor (44 mL).  Lifestyle    A blood pressure monitor and cuff.   Work with your health care provider to maintain a healthy body weight or to lose weight. Ask what an ideal weight is for you.  Get at least 30 minutes of exercise that causes your heart to beat faster (aerobic exercise) most days of the week. Activities may include walking, swimming, or biking.  Include exercise to strengthen your muscles (resistance exercise), such as Pilates or lifting weights, as part of your weekly exercise routine. Try to do these types of exercises for 30 minutes at least 3 days a week.  Do not use any products that contain nicotine or tobacco. These products include cigarettes, chewing tobacco, and vaping devices, such as e-cigarettes. If you need help quitting, ask your health care provider.  Monitor your blood pressure at home as told by your health care provider.  Keep all follow-up visits. This is important.  Medicines    Take over-the-counter and prescription medicines only as told by your health care provider. Follow directions carefully. Blood pressure medicines must be taken as prescribed.  Do not skip doses of blood pressure medicine. Doing this puts you at risk for problems and can make the medicine less effective.  Ask your health care provider about side effects or reactions to medicines that you should watch for.  Contact a health care provider if you:  Think you are having a reaction to a medicine you are taking.  Have headaches that keep coming back (recurring).  Feel dizzy.  Have swelling in your ankles.  Have trouble with your vision.  Get help right away if you:  Develop a severe headache or confusion.  Have unusual weakness or numbness.  Feel faint.  Have severe pain in your chest or abdomen.  Vomit repeatedly.  Have trouble breathing.  These symptoms may be an emergency. Get help right away. Call 911.  Do not wait to see if the symptoms will go away.  Do not drive yourself to the hospital.  Summary  Hypertension is when the force of blood pumping through your arteries is too strong. If this condition is not controlled, it may put you at risk for serious complications.  Your personal target blood pressure may vary depending on your medical conditions, your age, and other factors. For most people, a normal blood pressure is less than 120/80.  Hypertension is treated with lifestyle changes, medicines, or a combination of both. Lifestyle changes include losing weight, eating a healthy, low-sodium diet, exercising more, and limiting alcohol.  This information is not intended to replace advice given to you by your health care provider. Make sure you discuss any questions you have with your health care provider.

## 2024-03-26 NOTE — ED ADULT NURSE NOTE - CHIEF COMPLAINT QUOTE
sent from urgent care for hypertension. history of hypertension, was taking amlodipine and carvedilol until couple of days ago, was unable to fill prescription due to insurance issues. denies chest discomfort. no meds taken pta. originally seen at urgent care for throat discomfort and feeling of difficulty swallowing over past few days.

## 2024-03-26 NOTE — ED PROVIDER NOTE - OBJECTIVE STATEMENT
29 y/o male presents to the ED c/o HTN 27 y/o male with PMHx of HTN on carvedilol, amlodipine presents to the ED c/o HTN. States he hasn't been able to refill HTN medication due to insurance. Last dose of medication week-1.5 weeks ago. States he is also having difficulty swallowing, feels like there is something stuck in throat. Went to  for problems swallowing, was sent to ED for HTN. Endorses bitemporal vertex headache, occasional eyelid twitching, difficulty swallowing for 3 days. Denies gait imbalance, dizziness, cp. PCP [facility]. 29 y/o male with PMHx of HTN on Carvedilol ER 40 mg qd, Amlodipine 5 mg qd, presents ambulatory to the ED c/o HTN & HA. States he hasn't been able to refill his HTN meds due to insurance. Last dose of medication was 1.5 - 2 weeks ago. States he is also having occ. mild difficulty swallowing, feels like there is something stuck in throat. Went to [facility] for problems swallowing, referred to ED for high BP. Endorses 3 days of bitemporal/vertex headache, moderate severity w/o photophobia nor neck pain/stiffness, AMS, rash as well as occasional eyelid twitching, difficulty swallowing. Denies gait imbalance, dizziness, cp. No choking, gagging while swallowing nor voice change.  No vision/speech change, focal face/extremity numb/weak/tingling, gait abnl. dizziness, B/B changes.  PCP [facility]. 29 y/o male with PMHx of HTN on Carvedilol ER 40 mg qd, Amlodipine 5 mg qd, presents ambulatory to the ED c/o HTN & HA. States he hasn't been able to refill his HTN meds due to insurance. Last dose of medication was 1.5 - 2 weeks ago. States he is also having occ. mild difficulty swallowing, feels like there is something stuck in throat. Went to [facility] for problems swallowing, referred to ED for high BP. Endorses 3 days of bitemporal/vertex headache (gradual onset, mild - moderate severity w/o photophobia nor neck pain/stiffness, AMS, rash), as well as occasional eyelid twitching, difficulty swallowing. Denies gait imbalance, dizziness, cp. No choking, gagging while swallowing nor voice change.  No vision/speech change, focal face/extremity numb/weak/tingling, gait abnl. dizziness, B/B changes.  PCP [facility].

## 2024-03-26 NOTE — ED PROVIDER NOTE - NEUROLOGICAL, MLM
A&Ox3, normal speech, normal motor sensory exam A&Ox3, normal speech, normal motor/sensory exam, CN 2 - 12 intact, normal gait

## 2024-03-26 NOTE — ED PROVIDER NOTE - ENMT, MLM
oropharynx clear, normal voice, tolerating own secretions well, no swelling of tongue, lips or uvula oropharynx clear, MMM, normal voice, tolerating own secretions well, no swelling of tongue, lips or uvula

## 2024-03-26 NOTE — ED PROVIDER NOTE - MUSCULOSKELETAL, MLM
Spine appears normal, range of motion is not limited, no muscle or joint tenderness Spine appears normal, range of motion is not limited, no muscle or joint tenderness.  OCONNOR x 4, no focal extremity swelling nor tender

## 2024-03-26 NOTE — ED PROVIDER NOTE - CLINICAL SUMMARY MEDICAL DECISION MAKING FREE TEXT BOX
29 y/o male history of HTN ambulatory to ED per  referral c/o increased bp and headache after ran out of bp medications and cannot refill due to insurance issues. Neuro intact, patient in NAD. Elevated bp in triage.   Plan: EKG, CT head, labs including troponin, UA, dysphagia screen, trend bp, monitor, observe, reassess. 29 y/o male history of HTN ambulatory to ED per  referral c/o increased bp and headache after ran out of bp medications and cannot refill due to insurance issues. Neuro intact, patient in NAD. Elevated bp in triage.   Plan: EKG, CT head, labs including troponin, UA, dysphagia screen, trend bp, monitor, observe, reassess.    01:10, PRATEEK Gage MD:  Labs not actionable.  CT head report: no acute abnl.  pt remains with benign Neuro exam & no other current symptoms.  Oral anti-HTN meds administered.  Attempting to arrange with  Pharmacy 1 week's worth of his meds so he can f/u with his PMD.  ED RN aware to recheck VS prior to formal DC. 27 y/o male history of HTN ambulatory to ED per Urgi Care referral c/o increased BP and headache after ran out of BP medications nearly 2 weeks ago and cannot refill due to insurance issues.    Exam: Neuro intact, patient in NAD. Elevated BP in triage.   Plan: EKG, CT head, labs including troponin, UA, dysphagia screen, trend BP, monitor, observe, reassess.    01:10, C MD Merari:  Labs not actionable.  CT head report: no acute abnl.  Pt remains with benign Neuro exam & no other current symptoms.  Oral anti-HTN meds administered.  Attempting to arrange with  Pharmacy for 1 week's worth of his meds so he can f/u with his PMD.  ED RN aware to recheck VS prior to formal DC.

## 2024-03-26 NOTE — ED PROVIDER NOTE - PATIENT PORTAL LINK FT
You can access the FollowMyHealth Patient Portal offered by [facility] by registering at the following website: [email or web address]. By joining [facility]’s FollowMyHealth portal, you will also be able to view your health information using other applications (apps) compatible with our system.

## 2024-03-26 NOTE — ED ADULT TRIAGE NOTE - CHIEF COMPLAINT QUOTE
sent from urgent care for hypertension. history of hypertension, was taking amlodipine and carvedilol until couple of days ago, was unable to fill prescription due to insurance issues. denies chest discomfort. no meds taken pta. sent from urgent care for hypertension. history of hypertension, was taking amlodipine and carvedilol until couple of days ago, was unable to fill prescription due to insurance issues. denies chest discomfort. no meds taken pta. originally seen at urgent care for throat discomfort and feeling of difficulty swallowing over past few days.

## 2024-03-26 NOTE — ED PROVIDER NOTE - ST/T WAVE
[de-identified] : Referred by Dr. Mei\par \par Ms. 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 \par The patient reports that she developed vaginal spotting in May 2020 that prompted her to see her PMD and gynecologist. She was seen by Dr. Galeano for further evaluation and was noted to have a friable tissue in the upper vagina/cervical region. She underwent vaginal/cervical biopsy on 6/25/20 that demonstrated SCC, moderate to poorly differentiated. Of note, the patient has a history of cervical dysplasia for which she underwent LEEP in 2011 for persistent LGSIL on pap. Negative dysplasia noted on [facility]. \par \par She was referred to gyn onc and subsequently seen by Dr. Mei on 7/14/20. She underwent PET CT that was concerning for b/l lung nodules c/w metastatic disease. She was referred to IR for CT guided biopsy of one of the lung nodules that demonstrated SCC thought to be metastatic disease from cervix/vagina primary. \par \par 8/5/20: CT guided RLL lung biopsy\par \par Final Diagnosis\par LUNG, RIGHT LOWER LOBE, CT GUIDED CORE BIOPSY AND FNA\par POSITIVE FOR MALIGNANT CELLS.\par Squamous carcinoma favor metastasis from known squamous cell carcinoma of cervix/vagina\par \par She presents to medical oncology today, accompanied by her daughter Kelly. She reports feeling well overall with a good appetite and energy. She has not had any further vaginal bleeding. She has occasional loose BMs. Otherwise she denies fevers, chills, n/v, abdominal pain, CP, SOB, vaginal discharge/bleeding, urinary symptoms, changes in bowel habits. \par \par PMH: HTN, DM, HL\par PSH: LEEP 2011, 2 C sections\par \par All: NKDA\par \par Meds:\par Nifedipine, lisinopril, atorvastatin, metformin, atenolol, nateglinide, tradjenta, torsemide \par \par SH:\par , lives alone with independent ADLs\par Retired home health aide\par Denies smoking or alcohol use\par From [facility], moved to the  in 1970s. Has 4 daughters\par \par FH:\par Sister with breast cancer\par Mother had cancer, uncertain type\par \par GYN:\par Menopause in early 50s\par Never used hormone replacement therapy\par \par HCM:\par 6/18/2020 : Bilateral Breast Mammogram: No mammographic evidence of malignancy, BIRADS 2\par Colonoscopy - 2019, was negative per patient report\par Pap - 2017, was negative per patient report  [de-identified] : The patient presents for follow up, with her daughter on the speaker phone for participation in visit. \par She feels very well overall after undergoing C2 carbo/taxol. She continues to have excellent appetite and energy, and is able to continue with her ADLs. She does take a nap during the afternoons but this is not a change for her. She had a few days of arthralgias following chemotherapy again, but this has resolved since and did not interfere with her activities. She denies neuropathy or nausea. She has been eating and drinking without any issues. She denies fevers, chills, chills, n/v, abdominal pain, CP, SOB, cough, vaginal bleeding or discharge, urinary symptoms or changes in bowel habits.  nonspecific t wave abnormality

## 2024-03-26 NOTE — ED ADULT NURSE NOTE - NSFALLRISKINTERV_ED_ALL_ED

## 2024-03-26 NOTE — ED ADULT NURSE NOTE - MODE OF DISCHARGE
Flora Lanza is a 54 Ambulatory

## 2024-03-27 VITALS — DIASTOLIC BLOOD PRESSURE: 112 MMHG | SYSTOLIC BLOOD PRESSURE: 158 MMHG

## 2024-03-27 LAB
ALBUMIN SERPL ELPH-MCNC: 4.3 G/DL — SIGNIFICANT CHANGE UP (ref 3.3–5)
ALP SERPL-CCNC: 121 U/L — HIGH (ref 40–120)
ALT FLD-CCNC: 123 U/L — HIGH (ref 12–78)
ANION GAP SERPL CALC-SCNC: 8 MMOL/L — SIGNIFICANT CHANGE UP (ref 5–17)
APPEARANCE UR: CLEAR — SIGNIFICANT CHANGE UP
AST SERPL-CCNC: 60 U/L — HIGH (ref 15–37)
BASOPHILS # BLD AUTO: 0.14 K/UL — SIGNIFICANT CHANGE UP (ref 0–0.2)
BASOPHILS NFR BLD AUTO: 1 % — SIGNIFICANT CHANGE UP (ref 0–2)
BILIRUB SERPL-MCNC: 0.4 MG/DL — SIGNIFICANT CHANGE UP (ref 0.2–1.2)
BILIRUB UR-MCNC: NEGATIVE — SIGNIFICANT CHANGE UP
BUN SERPL-MCNC: 8 MG/DL — SIGNIFICANT CHANGE UP (ref 7–23)
CALCIUM SERPL-MCNC: 9.7 MG/DL — SIGNIFICANT CHANGE UP (ref 8.5–10.1)
CHLORIDE SERPL-SCNC: 104 MMOL/L — SIGNIFICANT CHANGE UP (ref 96–108)
CO2 SERPL-SCNC: 27 MMOL/L — SIGNIFICANT CHANGE UP (ref 22–31)
COLOR SPEC: YELLOW — SIGNIFICANT CHANGE UP
CREAT SERPL-MCNC: 0.83 MG/DL — SIGNIFICANT CHANGE UP (ref 0.5–1.3)
DIFF PNL FLD: NEGATIVE — SIGNIFICANT CHANGE UP
EGFR: 121 ML/MIN/1.73M2 — SIGNIFICANT CHANGE UP
EOSINOPHIL # BLD AUTO: 0.08 K/UL — SIGNIFICANT CHANGE UP (ref 0–0.5)
EOSINOPHIL NFR BLD AUTO: 0.6 % — SIGNIFICANT CHANGE UP (ref 0–6)
GLUCOSE SERPL-MCNC: 123 MG/DL — HIGH (ref 70–99)
GLUCOSE UR QL: NEGATIVE MG/DL — SIGNIFICANT CHANGE UP
HCT VFR BLD CALC: 47.2 % — SIGNIFICANT CHANGE UP (ref 39–50)
HGB BLD-MCNC: 15.8 G/DL — SIGNIFICANT CHANGE UP (ref 13–17)
IMM GRANULOCYTES NFR BLD AUTO: 0.3 % — SIGNIFICANT CHANGE UP (ref 0–0.9)
KETONES UR-MCNC: NEGATIVE MG/DL — SIGNIFICANT CHANGE UP
LEUKOCYTE ESTERASE UR-ACNC: NEGATIVE — SIGNIFICANT CHANGE UP
LYMPHOCYTES # BLD AUTO: 29 % — SIGNIFICANT CHANGE UP (ref 13–44)
LYMPHOCYTES # BLD AUTO: 4.21 K/UL — HIGH (ref 1–3.3)
MCHC RBC-ENTMCNC: 29.6 PG — SIGNIFICANT CHANGE UP (ref 27–34)
MCHC RBC-ENTMCNC: 33.5 GM/DL — SIGNIFICANT CHANGE UP (ref 32–36)
MCV RBC AUTO: 88.6 FL — SIGNIFICANT CHANGE UP (ref 80–100)
MONOCYTES # BLD AUTO: 0.69 K/UL — SIGNIFICANT CHANGE UP (ref 0–0.9)
MONOCYTES NFR BLD AUTO: 4.8 % — SIGNIFICANT CHANGE UP (ref 2–14)
NEUTROPHILS # BLD AUTO: 9.33 K/UL — HIGH (ref 1.8–7.4)
NEUTROPHILS NFR BLD AUTO: 64.3 % — SIGNIFICANT CHANGE UP (ref 43–77)
NITRITE UR-MCNC: NEGATIVE — SIGNIFICANT CHANGE UP
PH UR: 6.5 — SIGNIFICANT CHANGE UP (ref 5–8)
PLATELET # BLD AUTO: 423 K/UL — HIGH (ref 150–400)
POTASSIUM SERPL-MCNC: 3.4 MMOL/L — LOW (ref 3.5–5.3)
POTASSIUM SERPL-SCNC: 3.4 MMOL/L — LOW (ref 3.5–5.3)
PROT SERPL-MCNC: 9 GM/DL — HIGH (ref 6–8.3)
PROT UR-MCNC: NEGATIVE MG/DL — SIGNIFICANT CHANGE UP
RBC # BLD: 5.33 M/UL — SIGNIFICANT CHANGE UP (ref 4.2–5.8)
RBC # FLD: 12.6 % — SIGNIFICANT CHANGE UP (ref 10.3–14.5)
SODIUM SERPL-SCNC: 139 MMOL/L — SIGNIFICANT CHANGE UP (ref 135–145)
SP GR SPEC: 1 — SIGNIFICANT CHANGE UP (ref 1–1.03)
TROPONIN I, HIGH SENSITIVITY RESULT: 5.62 NG/L — SIGNIFICANT CHANGE UP
UROBILINOGEN FLD QL: 0.2 MG/DL — SIGNIFICANT CHANGE UP (ref 0.2–1)
WBC # BLD: 14.5 K/UL — HIGH (ref 3.8–10.5)
WBC # FLD AUTO: 14.5 K/UL — HIGH (ref 3.8–10.5)

## 2024-03-27 RX ORDER — AMLODIPINE BESYLATE 2.5 MG/1
5 TABLET ORAL ONCE
Refills: 0 | Status: COMPLETED | OUTPATIENT
Start: 2024-03-27 | End: 2024-03-27

## 2024-03-27 RX ORDER — CARVEDILOL PHOSPHATE 80 MG/1
12.5 CAPSULE, EXTENDED RELEASE ORAL EVERY 12 HOURS
Refills: 0 | Status: DISCONTINUED | OUTPATIENT
Start: 2024-03-27 | End: 2024-03-27

## 2024-03-27 RX ORDER — AMLODIPINE BESYLATE 2.5 MG/1
5 TABLET ORAL DAILY
Refills: 0 | Status: DISCONTINUED | OUTPATIENT
Start: 2024-03-27 | End: 2024-03-27

## 2024-03-27 RX ORDER — CARVEDILOL PHOSPHATE 80 MG/1
12.5 CAPSULE, EXTENDED RELEASE ORAL ONCE
Refills: 0 | Status: COMPLETED | OUTPATIENT
Start: 2024-03-27 | End: 2024-03-27

## 2024-03-27 RX ADMIN — CARVEDILOL PHOSPHATE 12.5 MILLIGRAM(S): 80 CAPSULE, EXTENDED RELEASE ORAL at 00:57

## 2024-03-27 RX ADMIN — AMLODIPINE BESYLATE 5 MILLIGRAM(S): 2.5 TABLET ORAL at 00:57

## 2024-04-01 ENCOUNTER — APPOINTMENT (OUTPATIENT)
Dept: CARDIOLOGY | Facility: CLINIC | Age: 29
End: 2024-04-01
Payer: COMMERCIAL

## 2024-04-01 ENCOUNTER — NON-APPOINTMENT (OUTPATIENT)
Age: 29
End: 2024-04-01

## 2024-04-01 VITALS
WEIGHT: 225 LBS | OXYGEN SATURATION: 99 % | BODY MASS INDEX: 34.1 KG/M2 | HEART RATE: 80 BPM | HEIGHT: 68 IN | DIASTOLIC BLOOD PRESSURE: 96 MMHG | SYSTOLIC BLOOD PRESSURE: 144 MMHG

## 2024-04-01 DIAGNOSIS — R42 DIZZINESS AND GIDDINESS: ICD-10-CM

## 2024-04-01 DIAGNOSIS — E66.9 OBESITY, UNSPECIFIED: ICD-10-CM

## 2024-04-01 DIAGNOSIS — H93.13 TINNITUS, BILATERAL: ICD-10-CM

## 2024-04-01 DIAGNOSIS — K76.0 FATTY (CHANGE OF) LIVER, NOT ELSEWHERE CLASSIFIED: ICD-10-CM

## 2024-04-01 PROCEDURE — 93000 ELECTROCARDIOGRAM COMPLETE: CPT

## 2024-04-01 PROCEDURE — 99205 OFFICE O/P NEW HI 60 MIN: CPT

## 2024-04-01 RX ORDER — METHYLPREDNISOLONE 4 MG/1
4 TABLET ORAL
Qty: 1 | Refills: 0 | Status: DISCONTINUED | COMMUNITY
Start: 2022-08-18 | End: 2024-04-01

## 2024-04-01 RX ORDER — DOXYCYCLINE 100 MG/1
100 CAPSULE ORAL
Qty: 20 | Refills: 0 | Status: DISCONTINUED | COMMUNITY
Start: 2022-10-21 | End: 2024-04-01

## 2024-04-01 RX ORDER — CARVEDILOL 12.5 MG/1
12.5 TABLET, FILM COATED ORAL TWICE DAILY
Refills: 0 | Status: ACTIVE | COMMUNITY

## 2024-04-01 RX ORDER — CARVEDILOL 25 MG/1
TABLET, FILM COATED ORAL
Refills: 0 | Status: DISCONTINUED | COMMUNITY
End: 2024-04-01

## 2024-04-01 RX ORDER — TOPIRAMATE 50 MG/1
50 TABLET, FILM COATED ORAL
Qty: 52 | Refills: 0 | Status: DISCONTINUED | COMMUNITY
Start: 2019-04-12 | End: 2024-04-01

## 2024-04-01 RX ORDER — SUMATRIPTAN 100 MG/1
100 TABLET, FILM COATED ORAL
Qty: 12 | Refills: 5 | Status: DISCONTINUED | COMMUNITY
Start: 2019-04-12 | End: 2024-04-01

## 2024-04-01 RX ORDER — AMOXICILLIN AND CLAVULANATE POTASSIUM 875; 125 MG/1; MG/1
875-125 TABLET, COATED ORAL
Qty: 14 | Refills: 0 | Status: DISCONTINUED | COMMUNITY
Start: 2023-03-10 | End: 2024-04-01

## 2024-04-01 RX ORDER — AMLODIPINE BESYLATE 10 MG/1
10 TABLET ORAL DAILY
Refills: 0 | Status: ACTIVE | COMMUNITY

## 2024-04-01 RX ORDER — AMOXICILLIN AND CLAVULANATE POTASSIUM 875; 125 MG/1; MG/1
875-125 TABLET, COATED ORAL
Qty: 20 | Refills: 0 | Status: DISCONTINUED | COMMUNITY
Start: 2023-12-05 | End: 2024-04-01

## 2024-04-01 RX ORDER — DOXYCYCLINE 100 MG/1
100 CAPSULE ORAL TWICE DAILY
Qty: 20 | Refills: 0 | Status: DISCONTINUED | COMMUNITY
Start: 2022-08-18 | End: 2024-04-01

## 2024-04-01 RX ORDER — AMLODIPINE BESYLATE 5 MG/1
5 TABLET ORAL
Refills: 0 | Status: DISCONTINUED | COMMUNITY
End: 2024-04-01

## 2024-04-01 NOTE — HISTORY OF PRESENT ILLNESS
[FreeTextEntry1] : 28 year old male with hx of HTN from 2017 ( while in  ), obesity , hyperlipidemia  came for cardiac evaluation with complain that his blood pressure was not well controlled since his medication was changed due to insurance approval , as per patient who did not take medication for 2 weeks , felt some throat discomfort  with difficulty swallowing  went to  urgent care where he was noted to have markedly  elevated blood pressure , was having headache , went to [facility] ER , patient was given medication , observed was sent home , patient started feeling dizziness since he was restarted on medication ,  patient does feel when gets up quickly or turn around quickly , some what similar to prior vertigo symptoms , Patient denies any chest pain or shortness of breath or palpitation ,   Patient is taking coreg 12.5 mg po BID , norvasc 10 mg po daily ,  patient  home BP is better , still elevated , patient does have significant snoring , does stop breathing in between during sleep ,   Patient was told to have fatty liver ,

## 2024-04-01 NOTE — PHYSICAL EXAM
[No Acute Distress] : no acute distress [Obese] : obese [Normal Conjunctiva] : normal conjunctiva [Normal Venous Pressure] : normal venous pressure [No Carotid Bruit] : no carotid bruit [Normal S1, S2] : normal S1, S2 [No Murmur] : no murmur [No Rub] : no rub [No Gallop] : no gallop [Clear Lung Fields] : clear lung fields [Good Air Entry] : good air entry [Soft] : abdomen soft [No Respiratory Distress] : no respiratory distress  [Non Tender] : non-tender [Normal Bowel Sounds] : normal bowel sounds [Normal Gait] : normal gait [No Edema] : no edema [No Cyanosis] : no cyanosis [No Clubbing] : no clubbing [No Varicosities] : no varicosities [No Rash] : no rash [No Skin Lesions] : no skin lesions [Moves all extremities] : moves all extremities [No Focal Deficits] : no focal deficits [Alert and Oriented] : alert and oriented [Normal Speech] : normal speech [Normal memory] : normal memory [Well Developed] : well developed [Normal Radial B/L] : normal radial B/L [Normal PT B/L] : normal PT B/L

## 2024-04-01 NOTE — END OF VISIT
Left a message with patient for INR follow up from Lab draw.     Last INR on 7/15/21 was 2.5.  Dose maintained.   Today's INR is 3.0 and is within goal range.    Current warfarin total weekly dose of 17.5 mg verified.  Informed the INR result is within therapeutic range and instructed to maintain current dose per protocol. Discussed dose and return date of 8/25/2021 for next INR. See Anticoagulation flowsheet.    Dr. Escalante is in the office today supervising the treatment.    Instructed to contact the clinic with any unusual bleeding or bruising, any changes in medications, diet, health status, lifestyle, or any other changes, questions or concerns.      
Patient returning call.  
Returned patient's phone call and stated instructions below.   
[Time Spent: ___ minutes] : I have spent [unfilled] minutes of time on the encounter.

## 2024-04-01 NOTE — ASSESSMENT
[FreeTextEntry1] : Patient with above hx   Dizziness/ with hx of vertigo  , tinnitus : likely related to vestibular origin  , positional vertigo ,  blood pressure mild elevated , stable heart rate : avoid sudden change in position , follow with ENT   Hypertension  suboptimal controlled : patient restarted taking medication  Norvasc 10 mg  , Coreg 12.5 mg po BID improving , encourage patient to follow diet restriction ( life style modification )  home BP  monitor , will obtain echo to assess ventricular function ,  renal doppler to rule out renal artery stenosis   hx of Migraine : vs prior surgery left oribital surgery  follow up with neurologist , will discuss with neurologist ,   Mixed hyperlipidemia :  encourage patient to follow life style modification , continue atorvastatin ( he does not remember the dose ) , close monitoring of LFTS as patient has fatty liver with elevated LFTS )  prediabetes :   consider  jardiance 25 mg po daily  vs ozempic , refer to endocrinology    Obesity / snoring : encourage life style modification , weight loss , home sleep study ,  Hx fatty liver : elevated  LFTS   encourage weight loss ,    follow up after 1 month

## 2024-04-02 ENCOUNTER — APPOINTMENT (OUTPATIENT)
Dept: CARDIOLOGY | Facility: CLINIC | Age: 29
End: 2024-04-02
Payer: COMMERCIAL

## 2024-04-02 PROCEDURE — 93306 TTE W/DOPPLER COMPLETE: CPT

## 2024-04-05 ENCOUNTER — APPOINTMENT (OUTPATIENT)
Dept: ULTRASOUND IMAGING | Facility: CLINIC | Age: 29
End: 2024-04-05
Payer: COMMERCIAL

## 2024-04-05 ENCOUNTER — OUTPATIENT (OUTPATIENT)
Dept: OUTPATIENT SERVICES | Facility: HOSPITAL | Age: 29
LOS: 1 days | End: 2024-04-05
Payer: COMMERCIAL

## 2024-04-05 ENCOUNTER — RESULT REVIEW (OUTPATIENT)
Age: 29
End: 2024-04-05

## 2024-04-05 DIAGNOSIS — I10 ESSENTIAL (PRIMARY) HYPERTENSION: ICD-10-CM

## 2024-04-05 DIAGNOSIS — Z98.890 OTHER SPECIFIED POSTPROCEDURAL STATES: Chronic | ICD-10-CM

## 2024-04-05 PROCEDURE — 93975 VASCULAR STUDY: CPT | Mod: 26

## 2024-04-05 PROCEDURE — 93975 VASCULAR STUDY: CPT

## 2024-04-08 ENCOUNTER — NON-APPOINTMENT (OUTPATIENT)
Age: 29
End: 2024-04-08

## 2024-04-10 ENCOUNTER — OUTPATIENT (OUTPATIENT)
Dept: OUTPATIENT SERVICES | Facility: HOSPITAL | Age: 29
LOS: 1 days | End: 2024-04-10
Payer: COMMERCIAL

## 2024-04-10 DIAGNOSIS — G47.33 OBSTRUCTIVE SLEEP APNEA (ADULT) (PEDIATRIC): ICD-10-CM

## 2024-04-10 DIAGNOSIS — Z98.890 OTHER SPECIFIED POSTPROCEDURAL STATES: Chronic | ICD-10-CM

## 2024-04-10 PROCEDURE — 95800 SLP STDY UNATTENDED: CPT

## 2024-04-12 ENCOUNTER — APPOINTMENT (OUTPATIENT)
Dept: OTOLARYNGOLOGY | Facility: CLINIC | Age: 29
End: 2024-04-12
Payer: COMMERCIAL

## 2024-04-12 VITALS
SYSTOLIC BLOOD PRESSURE: 124 MMHG | HEART RATE: 82 BPM | TEMPERATURE: 97.5 F | DIASTOLIC BLOOD PRESSURE: 77 MMHG | OXYGEN SATURATION: 98 %

## 2024-04-12 DIAGNOSIS — J32.9 CHRONIC SINUSITIS, UNSPECIFIED: ICD-10-CM

## 2024-04-12 DIAGNOSIS — R13.10 DYSPHAGIA, UNSPECIFIED: ICD-10-CM

## 2024-04-12 DIAGNOSIS — S02.30XS FRACTURE OF ORBITAL FLOOR, UNSPECIFIED SIDE, SEQUELA: ICD-10-CM

## 2024-04-12 PROCEDURE — 31231 NASAL ENDOSCOPY DX: CPT

## 2024-04-12 PROCEDURE — 99213 OFFICE O/P EST LOW 20 MIN: CPT | Mod: 25

## 2024-04-12 NOTE — HISTORY OF PRESENT ILLNESS
[Difficulty Swallowing] : difficulty swallowing [de-identified] : Mr. TOLENTINO is a 28 year-old male who presents with dysphagia. Reports having dysphagia in the last 2-3 weeks. Saw his PCP who reported hearing congestion in his throat and noticed redness in his throat. Treated with Amoxicillin BID with slight improvement. Still having difficulty swallowing, even with saliva.   No issues drinking warm fluids. Has history of chronic sinusitis and rhinitis with postnasal drip.

## 2024-04-15 ENCOUNTER — APPOINTMENT (OUTPATIENT)
Dept: CARDIOLOGY | Facility: CLINIC | Age: 29
End: 2024-04-15
Payer: COMMERCIAL

## 2024-04-15 VITALS
OXYGEN SATURATION: 99 % | HEIGHT: 68 IN | SYSTOLIC BLOOD PRESSURE: 128 MMHG | WEIGHT: 215 LBS | HEART RATE: 90 BPM | DIASTOLIC BLOOD PRESSURE: 82 MMHG | BODY MASS INDEX: 32.58 KG/M2

## 2024-04-15 DIAGNOSIS — R73.03 PREDIABETES.: ICD-10-CM

## 2024-04-15 DIAGNOSIS — Z87.898 PERSONAL HISTORY OF OTHER SPECIFIED CONDITIONS: ICD-10-CM

## 2024-04-15 DIAGNOSIS — I10 ESSENTIAL (PRIMARY) HYPERTENSION: ICD-10-CM

## 2024-04-15 DIAGNOSIS — R06.83 SNORING: ICD-10-CM

## 2024-04-15 DIAGNOSIS — E78.2 MIXED HYPERLIPIDEMIA: ICD-10-CM

## 2024-04-15 PROCEDURE — 99214 OFFICE O/P EST MOD 30 MIN: CPT

## 2024-04-15 PROCEDURE — G2211 COMPLEX E/M VISIT ADD ON: CPT

## 2024-04-15 RX ORDER — ATORVASTATIN CALCIUM 40 MG/1
40 TABLET, FILM COATED ORAL DAILY
Refills: 0 | Status: ACTIVE | COMMUNITY

## 2024-04-15 NOTE — HISTORY OF PRESENT ILLNESS
[FreeTextEntry1] : 28 year old male PMH: HLD, HTN from 2017 ( while in  ), obesity , hyperlipidemia here today for routine visit, reports since back on his antihypertensive medications his blood pressure has been adequately controlled and he is "feeling great"  he has lost weight approximately 20 LBS  (was 235 LBS now 215 LBS) Patient denies any chest pain or shortness of breath or palpitation ,   Historically he felt some throat discomfort  with difficulty swallowing  went to  urgent care where he was noted to have markedly elevated blood pressure , was having headache , went to [facility] ER , patient was given medication , observed was sent home and now doing well as above  Echo 4/2/24:  Left ventricular systolic function is normal with an ejection fraction visually estimated at 60 to 65 %, Normal left ventricular diastolic function. Normal right ventricular cavity size and normal systolic function trace TR., Normal pulmonary artery pressure.  Sleep study done results pending  Patient was told to have fatty liver ,

## 2024-04-15 NOTE — ASSESSMENT
[FreeTextEntry1] : Here today for routine follow up with above hx,  Hypertension: Controlled Renal US: No evidence of a significant renal artery stenosis. BP has significantly improved; he has been exercising with 20 LB intentional weight loss  Will return for exercise stress test as BP is Normotensive   HLD: CW statin obtain copy of labs rom PCP  close monitoring of LFTS as patient has fatty liver with elevated LFTS likely improve with weight loss  prediabetes: Dwight he will follow with his PCP for this and continue to monitor A1C which is likely to improve with loss and exercise    Obesity / snoring: Sleep study obtained results pending   follow up after 3 months

## 2024-05-13 ENCOUNTER — APPOINTMENT (OUTPATIENT)
Dept: CARDIOLOGY | Facility: CLINIC | Age: 29
End: 2024-05-13
Payer: COMMERCIAL

## 2024-05-13 PROCEDURE — 93015 CV STRESS TEST SUPVJ I&R: CPT

## 2024-05-23 ENCOUNTER — APPOINTMENT (OUTPATIENT)
Dept: INTERNAL MEDICINE | Facility: CLINIC | Age: 29
End: 2024-05-23
Payer: COMMERCIAL

## 2024-05-23 VITALS
SYSTOLIC BLOOD PRESSURE: 110 MMHG | DIASTOLIC BLOOD PRESSURE: 80 MMHG | BODY MASS INDEX: 30.62 KG/M2 | RESPIRATION RATE: 16 BRPM | TEMPERATURE: 98.3 F | HEIGHT: 68 IN | OXYGEN SATURATION: 98 % | HEART RATE: 73 BPM | WEIGHT: 202 LBS

## 2024-05-23 DIAGNOSIS — Z77.29 CONTACT WITH AND (SUSPECTED) EXPOSURE TO OTHER HAZARDOUS SUBSTANCES: ICD-10-CM

## 2024-05-23 PROCEDURE — 99204 OFFICE O/P NEW MOD 45 MIN: CPT

## 2024-05-23 RX ORDER — ATORVASTATIN CALCIUM 40 MG/1
40 TABLET, FILM COATED ORAL
Refills: 0 | Status: ACTIVE | COMMUNITY

## 2024-05-23 NOTE — RESULTS/DATA
[TextEntry] : Home sleep study: Date of study 4/10/2024  AHI 23.1 events/ hour jenaro 85%  No Xrays available in the chart.

## 2024-05-23 NOTE — PHYSICAL EXAM
[No Acute Distress] : no acute distress [Normal Oropharynx] : normal oropharynx [IV] : Mallampati Class: IV [Normal Appearance] : normal appearance [No Neck Mass] : no neck mass [Normal Rate/Rhythm] : normal rate/rhythm [Normal S1, S2] : normal s1, s2 [No Murmurs] : no murmurs [No Resp Distress] : no resp distress [Clear to Auscultation Bilaterally] : clear to auscultation bilaterally [No Abnormalities] : no abnormalities [Normal Gait] : normal gait [No Clubbing] : no clubbing [No Cyanosis] : no cyanosis [No Edema] : no edema [Normal Color/ Pigmentation] : normal color/ pigmentation [No Focal Deficits] : no focal deficits [Oriented x3] : oriented x3 [Normal Affect] : normal affect

## 2024-05-23 NOTE — PLAN
[TextEntry] : Obstructive Sleep Apnea (ACACIA): - Moderate sleep apnea with 23 events per hour as per sleep study. - Plan:   - Order CPAP titration study at the sleep center to determine optimal pressure settings.   - Schedule follow-up appointment in three months to evaluate treatment efficacy and patient compliance.   History of exposure to burn pits during  service. - No specific pulmnary symptoms though he feels that he has sinus issues related to it. - Plan:   - Encourage patient to seek evaluation at the [facility] for specialized care and resources related to burn pit exposure. I told him that I have limited experience with this specific condition. He said that he has an appointment in a few months.   - Continue current medications as needed for sinus infections.   - Schedule Pulmonary Function Test (PFT) during the next visit to assess lung function.  - His pulmonary exam is normal but I ordered a CXR for the completion. N  Deviated Septum: - Difficulty breathing at rest due to left orbital floor fracture and broken nose. - Plan:   - Monitor symptoms and consider referral to an ENT specialist if symptoms worsen or impact quality of life.

## 2024-05-23 NOTE — HISTORY OF PRESENT ILLNESS
[Never] : never [TextBox_4] :  He was previously diagnosed with sleep apnea after undergoing a sleep study, which identified moderate severity characterized by 23 events per hour. Silas reports symptoms such as loud snoring, occasional cessation of breathing during sleep, waking up gasping for air, and persistent tiredness despite getting an adequate amount of sleep. He also has an [place] Sleepiness Scale score of 9, suggesting near abnormal daytime sleepiness. His typical sleep schedule is from 9 to 10 pm and waking up betwen 5:30 to 6 pm  Silas's medical history includes hypertension and chronic sinusitis with rhinitis, which he believes is due to exposure to burn pits during his  service in [place] and [place]. For his sinus conditions, he uses sodium chloride spray and amoxicillin. Additionally, he suffers from acid reflux. Silas denies experiencing daily coughing, coughing out blood, wheezing, and does not smoke or use e-cigarettes or marijuana.  He also mentions a past injury that resulted in a deviated septum from a left orbital floor fracture and broken nose, occasionally causing difficulty breathing at rest. His physical activities are limited by unusual tiredness, although he does not experience shortness of breath.  Regarding imaging, Silas's last chest x-ray was focused on his knee rather than his respiratory system, and he has not been informed of any chest abnormalities from previous imaging. Currently, he is not taking any specific medication for his pulmonary issues but manages his sinus conditions as needed.

## 2024-05-23 NOTE — REASON FOR VISIT
[Consultation] : a consultation [Sleep Evaluation] : sleep evaluation [TextBox_44] : history of burn pit exposure [TextBox_13] : Dr. Marsh

## 2024-05-24 ENCOUNTER — APPOINTMENT (OUTPATIENT)
Dept: RADIOLOGY | Facility: CLINIC | Age: 29
End: 2024-05-24
Payer: COMMERCIAL

## 2024-05-24 ENCOUNTER — OUTPATIENT (OUTPATIENT)
Dept: OUTPATIENT SERVICES | Facility: HOSPITAL | Age: 29
LOS: 1 days | End: 2024-05-24
Payer: COMMERCIAL

## 2024-05-24 DIAGNOSIS — Z77.29 CONTACT WITH AND (SUSPECTED) EXPOSURE TO OTHER HAZARDOUS SUBSTANCES: ICD-10-CM

## 2024-05-24 DIAGNOSIS — Z98.890 OTHER SPECIFIED POSTPROCEDURAL STATES: Chronic | ICD-10-CM

## 2024-05-24 PROCEDURE — 71046 X-RAY EXAM CHEST 2 VIEWS: CPT | Mod: 26

## 2024-05-24 PROCEDURE — 71046 X-RAY EXAM CHEST 2 VIEWS: CPT

## 2024-05-26 ENCOUNTER — NON-APPOINTMENT (OUTPATIENT)
Age: 29
End: 2024-05-26

## 2024-05-28 ENCOUNTER — NON-APPOINTMENT (OUTPATIENT)
Age: 29
End: 2024-05-28

## 2024-06-03 ENCOUNTER — OUTPATIENT (OUTPATIENT)
Dept: OUTPATIENT SERVICES | Facility: HOSPITAL | Age: 29
LOS: 1 days | End: 2024-06-03
Payer: COMMERCIAL

## 2024-06-03 DIAGNOSIS — G47.33 OBSTRUCTIVE SLEEP APNEA (ADULT) (PEDIATRIC): ICD-10-CM

## 2024-06-03 DIAGNOSIS — Z98.890 OTHER SPECIFIED POSTPROCEDURAL STATES: Chronic | ICD-10-CM

## 2024-06-03 PROCEDURE — 95811 POLYSOM 6/>YRS CPAP 4/> PARM: CPT | Mod: 26

## 2024-06-03 PROCEDURE — 95811 POLYSOM 6/>YRS CPAP 4/> PARM: CPT

## 2024-06-05 ENCOUNTER — NON-APPOINTMENT (OUTPATIENT)
Age: 29
End: 2024-06-05

## 2024-06-05 DIAGNOSIS — G47.33 OBSTRUCTIVE SLEEP APNEA (ADULT) (PEDIATRIC): ICD-10-CM

## 2024-06-05 NOTE — ED ADULT NURSE NOTE - NS ED NOTE ABUSE RESPONSE YN
Case Management Discharge Note      Final Note: Routine home                 Transportation Services  Private: Car    Final Discharge Disposition Code: 01 - home or self-care   Yes

## 2024-06-26 ENCOUNTER — EMERGENCY (EMERGENCY)
Facility: HOSPITAL | Age: 29
LOS: 0 days | Discharge: HOME | End: 2024-06-26
Attending: STUDENT IN AN ORGANIZED HEALTH CARE EDUCATION/TRAINING PROGRAM
Payer: COMMERCIAL

## 2024-06-26 VITALS
OXYGEN SATURATION: 100 % | TEMPERATURE: 98 F | RESPIRATION RATE: 17 BRPM | HEART RATE: 60 BPM | DIASTOLIC BLOOD PRESSURE: 90 MMHG | SYSTOLIC BLOOD PRESSURE: 123 MMHG

## 2024-06-26 VITALS — HEIGHT: 68 IN | WEIGHT: 184.97 LBS

## 2024-06-26 DIAGNOSIS — R07.89 OTHER CHEST PAIN: ICD-10-CM

## 2024-06-26 DIAGNOSIS — R06.02 SHORTNESS OF BREATH: ICD-10-CM

## 2024-06-26 DIAGNOSIS — Z98.890 OTHER SPECIFIED POSTPROCEDURAL STATES: Chronic | ICD-10-CM

## 2024-06-26 DIAGNOSIS — I10 ESSENTIAL (PRIMARY) HYPERTENSION: ICD-10-CM

## 2024-06-26 LAB
ALBUMIN SERPL ELPH-MCNC: 3.8 G/DL — SIGNIFICANT CHANGE UP (ref 3.3–5)
ALP SERPL-CCNC: 129 U/L — HIGH (ref 40–120)
ALT FLD-CCNC: 41 U/L — SIGNIFICANT CHANGE UP (ref 12–78)
ANION GAP SERPL CALC-SCNC: 5 MMOL/L — SIGNIFICANT CHANGE UP (ref 5–17)
AST SERPL-CCNC: 27 U/L — SIGNIFICANT CHANGE UP (ref 15–37)
BASOPHILS # BLD AUTO: 0.09 K/UL — SIGNIFICANT CHANGE UP (ref 0–0.2)
BASOPHILS NFR BLD AUTO: 1 % — SIGNIFICANT CHANGE UP (ref 0–2)
BILIRUB SERPL-MCNC: 0.4 MG/DL — SIGNIFICANT CHANGE UP (ref 0.2–1.2)
BUN SERPL-MCNC: 15 MG/DL — SIGNIFICANT CHANGE UP (ref 7–23)
CALCIUM SERPL-MCNC: 9.4 MG/DL — SIGNIFICANT CHANGE UP (ref 8.5–10.1)
CHLORIDE SERPL-SCNC: 108 MMOL/L — SIGNIFICANT CHANGE UP (ref 96–108)
CO2 SERPL-SCNC: 26 MMOL/L — SIGNIFICANT CHANGE UP (ref 22–31)
CREAT SERPL-MCNC: 1 MG/DL — SIGNIFICANT CHANGE UP (ref 0.5–1.3)
D DIMER BLD IA.RAPID-MCNC: <150 NG/ML DDU — SIGNIFICANT CHANGE UP
EGFR: 104 ML/MIN/1.73M2 — SIGNIFICANT CHANGE UP
EOSINOPHIL # BLD AUTO: 0.33 K/UL — SIGNIFICANT CHANGE UP (ref 0–0.5)
EOSINOPHIL NFR BLD AUTO: 3.7 % — SIGNIFICANT CHANGE UP (ref 0–6)
GLUCOSE SERPL-MCNC: 104 MG/DL — HIGH (ref 70–99)
HCT VFR BLD CALC: 40.5 % — SIGNIFICANT CHANGE UP (ref 39–50)
HGB BLD-MCNC: 13.5 G/DL — SIGNIFICANT CHANGE UP (ref 13–17)
IMM GRANULOCYTES NFR BLD AUTO: 0.2 % — SIGNIFICANT CHANGE UP (ref 0–0.9)
LYMPHOCYTES # BLD AUTO: 3.52 K/UL — HIGH (ref 1–3.3)
LYMPHOCYTES # BLD AUTO: 39.4 % — SIGNIFICANT CHANGE UP (ref 13–44)
MCHC RBC-ENTMCNC: 30.8 PG — SIGNIFICANT CHANGE UP (ref 27–34)
MCHC RBC-ENTMCNC: 33.3 GM/DL — SIGNIFICANT CHANGE UP (ref 32–36)
MCV RBC AUTO: 92.3 FL — SIGNIFICANT CHANGE UP (ref 80–100)
MONOCYTES # BLD AUTO: 0.8 K/UL — SIGNIFICANT CHANGE UP (ref 0–0.9)
MONOCYTES NFR BLD AUTO: 9 % — SIGNIFICANT CHANGE UP (ref 2–14)
NEUTROPHILS # BLD AUTO: 4.17 K/UL — SIGNIFICANT CHANGE UP (ref 1.8–7.4)
NEUTROPHILS NFR BLD AUTO: 46.7 % — SIGNIFICANT CHANGE UP (ref 43–77)
PLATELET # BLD AUTO: 344 K/UL — SIGNIFICANT CHANGE UP (ref 150–400)
POTASSIUM SERPL-MCNC: 3.5 MMOL/L — SIGNIFICANT CHANGE UP (ref 3.5–5.3)
POTASSIUM SERPL-SCNC: 3.5 MMOL/L — SIGNIFICANT CHANGE UP (ref 3.5–5.3)
PROT SERPL-MCNC: 7.5 GM/DL — SIGNIFICANT CHANGE UP (ref 6–8.3)
RBC # BLD: 4.39 M/UL — SIGNIFICANT CHANGE UP (ref 4.2–5.8)
RBC # FLD: 13.1 % — SIGNIFICANT CHANGE UP (ref 10.3–14.5)
SODIUM SERPL-SCNC: 139 MMOL/L — SIGNIFICANT CHANGE UP (ref 135–145)
TROPONIN I, HIGH SENSITIVITY RESULT: 5.59 NG/L — SIGNIFICANT CHANGE UP
WBC # BLD: 8.93 K/UL — SIGNIFICANT CHANGE UP (ref 3.8–10.5)
WBC # FLD AUTO: 8.93 K/UL — SIGNIFICANT CHANGE UP (ref 3.8–10.5)

## 2024-06-26 PROCEDURE — 36415 COLL VENOUS BLD VENIPUNCTURE: CPT

## 2024-06-26 PROCEDURE — 85379 FIBRIN DEGRADATION QUANT: CPT

## 2024-06-26 PROCEDURE — 71045 X-RAY EXAM CHEST 1 VIEW: CPT

## 2024-06-26 PROCEDURE — 99285 EMERGENCY DEPT VISIT HI MDM: CPT | Mod: 25

## 2024-06-26 PROCEDURE — 93005 ELECTROCARDIOGRAM TRACING: CPT

## 2024-06-26 PROCEDURE — 93010 ELECTROCARDIOGRAM REPORT: CPT

## 2024-06-26 PROCEDURE — 85025 COMPLETE CBC W/AUTO DIFF WBC: CPT

## 2024-06-26 PROCEDURE — 99285 EMERGENCY DEPT VISIT HI MDM: CPT

## 2024-06-26 PROCEDURE — 36000 PLACE NEEDLE IN VEIN: CPT

## 2024-06-26 PROCEDURE — 71045 X-RAY EXAM CHEST 1 VIEW: CPT | Mod: 26

## 2024-06-26 PROCEDURE — 84484 ASSAY OF TROPONIN QUANT: CPT

## 2024-06-26 PROCEDURE — 80053 COMPREHEN METABOLIC PANEL: CPT

## 2024-06-26 RX ORDER — CALCIUM CARBONATE 500(1250)
1 TABLET ORAL ONCE
Refills: 0 | Status: COMPLETED | OUTPATIENT
Start: 2024-06-26 | End: 2024-06-26

## 2024-06-26 RX ADMIN — Medication 1 TABLET(S): at 03:26

## 2024-06-26 NOTE — ED PROVIDER NOTE - NSFOLLOWUPINSTRUCTIONS_ED_ALL_ED_FT
** Follow up with your primary care doctor in the next 72 hours.   ** A referral coordinator will reach out to you to help you schedule an appointment with a GI doctor.    ** Go to the nearest Emergency Department if you experience any new or concerning symptoms, such as:   - worsening pain  - chest pain  - difficulty breathing  - passing out  - unable to eat or drink  - unable to move or feel part of your body  - fever, chills

## 2024-06-26 NOTE — ED ADULT NURSE NOTE - NSFALLUNIVINTERV_ED_ALL_ED
Bed/Stretcher in lowest position, wheels locked, appropriate side rails in place/Call bell, personal items and telephone in reach/Instruct patient to call for assistance before getting out of bed/chair/stretcher/Non-slip footwear applied when patient is off stretcher/[place] to call system/Physically safe environment - no spills, clutter or unnecessary equipment/Purposeful proactive rounding/Room/bathroom lighting operational, light cord in reach

## 2024-06-26 NOTE — ED ADULT TRIAGE NOTE - CHIEF COMPLAINT QUOTE
Pt ambulatory to ED with c/o chest pain, SOB since 9pm. Denies palpitations, dizziness, numbness, tingling, jaw pain, numbness, tingling. 98% on RA in triage. No meds taken PTA. Pt does not appear in respiratory distress or diaphoretic. A&OX3, -allergies. hx of HTN. Sent for STAT EKG Pt ambulatory to ED with c/o chest pain, SOB since 9pm. Denies palpitations, dizziness, jaw pain, numbness, tingling. 98% on RA in triage. No meds taken PTA. Pt does not appear in respiratory distress or diaphoretic. A&OX3, -allergies. hx of HTN. Sent for STAT EKG

## 2024-06-26 NOTE — ED ADULT NURSE NOTE - OBJECTIVE STATEMENT
patient complains of intermittent chest discomfort since 9pm, states pain feels like acid reflux but is unsure and wanted to make sure no cardiac issues.

## 2024-06-26 NOTE — ED ADULT NURSE REASSESSMENT NOTE - NS ED NURSE REASSESS COMMENT FT1
patient discharged home. written and verbal discharge and followup instructions given to patient, patient verbalized back understanding. alert and oriented x 4, respirations even and unlabored, ms strength 5/5 upper and lower ext bilaterally. denies chest pain/discomfort. discharged home from ED with family member.

## 2024-06-26 NOTE — ED ADULT NURSE NOTE - CHIEF COMPLAINT QUOTE
Pt ambulatory to ED with c/o chest pain, SOB since 9pm. Denies palpitations, dizziness, jaw pain, numbness, tingling. 98% on RA in triage. No meds taken PTA. Pt does not appear in respiratory distress or diaphoretic. A&OX3, -allergies. hx of HTN. Sent for STAT EKG

## 2024-06-26 NOTE — ED PROVIDER NOTE - WR ORDER ID 1
Problem: VTE, Risk for  Intervention: # Implement/maintain early mobilization  Early mobilization is recommended including HOB up or dangle for 20 minutes during the first 24 hours; Progress activity 2-4 times/day each subsequent day, unless contraindicated.   No signs or symptoms of DVT. Chris hose on during the day.         [id number]

## 2024-06-26 NOTE — ED PROVIDER NOTE - PHYSICAL EXAMINATION
Stable
Tegan Arshad MD Attending  GEN: Patient awake alert NAD.   HEENT: normocephalic, atraumatic, EOMI, no scleral icterus  CARDIAC: RRR, S1, S2  PULM: CTA B/L no wheeze, rhonchi, rales.   ABD: soft NT, ND, no rebound no guarding  MSK: Moving all extremities, no edema. 5/5 strength and full ROM in all extremities.     NEURO: A&Ox3, gait normal, no focal neurological deficits, CN 2-12 grossly intact  SKIN: warm, dry, no rash.

## 2024-06-26 NOTE — ED PROVIDER NOTE - PATIENT PORTAL LINK FT
You can access the FollowMyHealth Patient Portal offered by [facility] by registering at the following website: [email or web address]. By joining [facility]’s FollowMyHealth portal, you will also be able to view your health information using other applications (apps) compatible with our system.

## 2024-06-26 NOTE — ED ADULT NURSE NOTE - SUICIDE SCREENING QUESTION 1
Problem: Pain  Goal: Acceptable pain level achieved/maintained at rest using appropriate pain scale for the patient  5/2/2024 1632 by Gricelda Sanders RN  Outcome: Met, complete goal  5/2/2024 0819 by Gricelda Sanders RN  Outcome: Monitoring/Evaluating progress  Goal: Acceptable pain level achieved/maintained with activity using appropriate pain scale for the patient  5/2/2024 1632 by Gricelda Sanders RN  Outcome: Met, complete goal  5/2/2024 0819 by Gricelda Sanders RN  Outcome: Monitoring/Evaluating progress  Goal: Acceptable pain level achieved/maintained without oversedation  5/2/2024 1632 by Gricelda Sanders RN  Outcome: Met, complete goal  5/2/2024 0819 by Gricelda Sanders RN  Outcome: Monitoring/Evaluating progress     Problem: Nausea/Vomiting  Goal: Maintains oral intake with decreased/no reports of nausea/vomiting  5/2/2024 1632 by Gricelda Sanders RN  Outcome: Met, complete goal  5/2/2024 0819 by Gricelda Sanders RN  Outcome: Monitoring/Evaluating progress  Goal: Current weight maintained or increased  5/2/2024 1632 by Gricelda Sanders RN  Outcome: Met, complete goal  5/2/2024 0819 by Gricelda Sanders RN  Outcome: Monitoring/Evaluating progress  Goal: Verbalizes understanding of s/s and strategies to control nausea/vomiting  Description: Document education using the patient education activity.   5/2/2024 1632 by Gricelda Sanders RN  Outcome: Met, complete goal  5/2/2024 0819 by Gricelda Sanders RN  Outcome: Monitoring/Evaluating progress  Goal: Decreased reports of nausea/vomiting (Hospice)  5/2/2024 1632 by Gricelda Sanders RN  Outcome: Met, complete goal  5/2/2024 0819 by Gricelda Sanders RN  Outcome: Monitoring/Evaluating progress     Problem: Urinary Tract Infection  Goal: Urinary Tract Infection (UTI) s/s resolved  5/2/2024 1632 by Gricelda Sanders RN  Outcome: Met, complete goal  5/2/2024 0819 by Gricelda Sanders RN  Outcome: Monitoring/Evaluating  progress  Goal: Verbalizes s/s of UTI and treatment plan  Description: Document on Patient Education Activity   5/2/2024 1632 by Gricelda Sanders RN  Outcome: Met, complete goal  5/2/2024 0819 by Gricelda Sanders RN  Outcome: Monitoring/Evaluating progress     Problem: Diabetes  Goal: Achieves glycemic balance  Description: Goal is to maintain blood sugar within range with no episodes of hypoglycemia  5/2/2024 1632 by Gricelda Sanders RN  Outcome: Met, complete goal  5/2/2024 0819 by Gricelda Sanders RN  Outcome: Monitoring/Evaluating progress  Goal: Verbalizes/demonstrates understanding of NEW diagnosis of diabetes and management  Description: Document on Patient Education Activity  5/2/2024 1632 by Gricelda Sanders RN  Outcome: Met, complete goal  5/2/2024 0819 by Gricelda Sanders RN  Outcome: Monitoring/Evaluating progress  Goal: Verbalizes understanding of diabetes management including how to use HbA1C to evaluate status of blood sugar over time (Diabetes is NOT a new diagnosis)  Description: Diabetes Education  5/2/2024 1632 by Gricelda Sanders RN  Outcome: Met, complete goal  5/2/2024 0819 by Gricelda Sanders RN  Outcome: Monitoring/Evaluating progress  Goal: Demonstrates ability to self-administer insulin  Description: Document on Patient Education Activity  5/2/2024 1632 by Gricelda Sanders RN  Outcome: Met, complete goal  5/2/2024 0819 by Gricelda Sanders RN  Outcome: Monitoring/Evaluating progress     Problem: Fluid Volume Deficit  Goal: Vital signs are maintained within parameters  5/2/2024 1632 by Gricelda Sanders RN  Outcome: Met, complete goal  5/2/2024 0819 by Gricelda Sanders RN  Outcome: Monitoring/Evaluating progress  Goal: Oral intake is resumed and tolerated  5/2/2024 1632 by Gricelda Sanders RN  Outcome: Met, complete goal  5/2/2024 0819 by Gricelda Sanders RN  Outcome: Monitoring/Evaluating progress  Goal: Genitourinary function returned to  baseline  5/2/2024 1632 by Gricelda Sanders, RN  Outcome: Met, complete goal  5/2/2024 0819 by Gricelda Sanders, RN  Outcome: Monitoring/Evaluating progress  Goal: Verbalizes understanding of s/s and cause/treatment for fluid loss  Description: Document on Patient Education Activity  5/2/2024 1632 by Gricelda Sanders, RN  Outcome: Met, complete goal  5/2/2024 0819 by Gricelda Sanders, RN  Outcome: Monitoring/Evaluating progress      No

## 2024-06-26 NOTE — ED PROVIDER NOTE - CLINICAL SUMMARY MEDICAL DECISION MAKING FREE TEXT BOX
Tegan Arshad MD, Attending  ddx includes, but is not limited to the following: Low suspicion for organic disease such as ACS, PE, myocarditis, pericarditis, pneumonia.  Possible anxiety.  plan: Will check screening labs, including troponin and D-dimer, chest x-ray.  Anticipate outpatient follow-up.  update: see progress notes.   ----

## 2024-06-26 NOTE — ED PROVIDER NOTE - PROGRESS NOTE DETAILS
Tegan Arshad MD, Attending  No acute Tegan Arshad MD, Attending  results non actionable. pt reassessed, feeling better, agreeable to GI referral for GERD and PMD followup.

## 2024-06-26 NOTE — ED PROVIDER NOTE - OBJECTIVE STATEMENT
28-year-old male past medical history of hypertension, presents with left-sided chest tightness and shortness of breath since 9 PM.  Pain is pressure, nonexertional, nonreproducible, nonpleuritic.  Patient denies nausea, vomiting, diaphoresis.  Denies recent immobilization, long distance travel, prior history of clot.

## 2024-07-22 ENCOUNTER — APPOINTMENT (OUTPATIENT)
Dept: CARDIOLOGY | Facility: CLINIC | Age: 29
End: 2024-07-22
Payer: COMMERCIAL

## 2024-07-22 ENCOUNTER — NON-APPOINTMENT (OUTPATIENT)
Age: 29
End: 2024-07-22

## 2024-07-22 VITALS
OXYGEN SATURATION: 99 % | BODY MASS INDEX: 29.4 KG/M2 | DIASTOLIC BLOOD PRESSURE: 82 MMHG | SYSTOLIC BLOOD PRESSURE: 120 MMHG | HEART RATE: 64 BPM | WEIGHT: 194 LBS | HEIGHT: 68 IN

## 2024-07-22 DIAGNOSIS — E66.9 OBESITY, UNSPECIFIED: ICD-10-CM

## 2024-07-22 DIAGNOSIS — E78.2 MIXED HYPERLIPIDEMIA: ICD-10-CM

## 2024-07-22 DIAGNOSIS — I10 ESSENTIAL (PRIMARY) HYPERTENSION: ICD-10-CM

## 2024-07-22 PROCEDURE — 93000 ELECTROCARDIOGRAM COMPLETE: CPT

## 2024-07-22 PROCEDURE — G2211 COMPLEX E/M VISIT ADD ON: CPT

## 2024-07-22 PROCEDURE — 99214 OFFICE O/P EST MOD 30 MIN: CPT

## 2024-07-22 NOTE — ASSESSMENT
[FreeTextEntry1] : Here today for routine follow up with above hx,  Hypertension: Controlled Renal US: No evidence of a significant renal artery stenosis. BP has significantly improved; he has been exercising  will consider decreasing coreg change  6.25 mg PO bid if his BP runs < 100 mm hg  Will return for exercise stress test as BP is Normotensive   HLD: off of medication  close monitoring of LFTS as patient has fatty liver with elevated LFTS likely improve with weight loss  prediabetes:  HB A1c  a1c 6.6   most recent  5.5  Sates he will follow with his PCP for this and continue to monitor A1C which is likely to improve with loss and exercise    Obesity / snoring:  Moderate sleep apnea ,   follow up after 3 months

## 2024-07-22 NOTE — HISTORY OF PRESENT ILLNESS
[FreeTextEntry1] : 28 year old male PMH: HLD, HTN from 2017 ( while in  ), obesity , hyperlipidemia came for follow up says he is feeling great. losing weight with life style modification  ,  lost 8 pounds  from May 23rd ,  patient maximum weight was 235 pounds   Patient denies any chest pain or shortness of breath or palpitation ,  his blood pressure is low normal range , denies any dizziness , patient was taken off  statin by primary ,as his BP   Patient was diagnosed to have moderate sleep apnea , seen by sleep specialist ,   Echo 4/2/24:  Left ventricular systolic function is normal with an ejection fraction visually estimated at 60 to 65 %, Normal left ventricular diastolic function. Normal right ventricular cavity size and normal systolic function trace TR., Normal pulmonary artery pressure.  normal renal doppler   Patient was told to have fatty liver with elevated LFTS ,  blood work showed elevated   , HDL 41  , HBa1c 6.6 done in March 2024

## 2024-07-22 NOTE — CARDIOLOGY SUMMARY
[de-identified] : 7/22/24 sinus rhythm [de-identified] : 5/13/24 10.1 METS  86%MPHR No ST changes  [de-identified] : 4/2/24 Normal ventricular function, 60-65% normal study

## 2024-08-07 ENCOUNTER — APPOINTMENT (OUTPATIENT)
Dept: INTERNAL MEDICINE | Facility: CLINIC | Age: 29
End: 2024-08-07

## 2024-08-07 PROCEDURE — 94727 GAS DIL/WSHOT DETER LNG VOL: CPT

## 2024-08-07 PROCEDURE — 99214 OFFICE O/P EST MOD 30 MIN: CPT | Mod: 25

## 2024-08-07 PROCEDURE — 94060 EVALUATION OF WHEEZING: CPT

## 2024-08-07 PROCEDURE — ZZZZZ: CPT

## 2024-08-07 PROCEDURE — 94729 DIFFUSING CAPACITY: CPT

## 2024-08-07 NOTE — PLAN
[TextEntry] : 1. Obstructive Sleep Apnea (ACACIA) - Patient had a sleep study in April, showing 23.1 events per hour and oxygen saturation of 85%. - CPAP titration recommended a pressure of 7 cm H2O with a [employer] Nasal Pillow mask. - Patient has lost weight since the initial visit but still experiences symptoms of ACACIA. - Patient reports dozing off on the couch and feeling like air wasn't getting in. - CPAP machine delivery scheduled for the 21st. - Plan: Expedite CPAP machine delivery and set up for the patient. Instruct the patient to download the shaylee associated with the machine to monitor progress. Schedule a follow-up appointment in three months to review the data from the machine and assess improvement.  Obestity Great improvement in the weight since the last visit Encouraged to continue working on weight loss  H/o burn pit exposure PFT done here -- Normal PFT He is due to see [facility] burn pit program for this.

## 2024-08-07 NOTE — RESULTS/DATA
[TextEntry] :  PFT 08/07/2024 Spirometry (FEV1): 107 (%predicted) Ratio: 92 Lung volumes (TLC):    79(% predicted) Diffusion capacity (DLCO): 82  (% predicted) No significant bronchodilator response   Interpretation: Normal. PFT.   Home sleep study: Date of study 4/10/2024  AHI 23.1 events/ hour jenaro 85%  CPAP titreation with 7 cm water

## 2024-08-07 NOTE — HISTORY OF PRESENT ILLNESS
[FreeTextEntry1] : The patient, Mr. Bowers, reports that he is still experiencing symptoms of obstructive sleep apnea (ACACIA) despite significant weight loss since his last visit. He mentions dozing off on the couch and waking up feeling like air wasn't getting in, as well as constantly waking up tired. He is currently awaiting the delivery and setup of his CPAP machine, which was ordered in June. The machine is scheduled to be delivered and set up on the 21st of this month.  Mr. Bowers inquires about the cause of his sleep apnea, mentioning that he has lost weight and has no family history of the condition. He reports having chronic sinusitis and a deviated septum due to an orbital floor fracture sustained during his time in the [employer]. He clarifies that the issue is with the back of his throat, not the anterior part of his nose. [facility] but has [facility]. [facility] Mr. Bowers expresses frustration with the long wait times for [facility] appointments.  The patient reports significant weight loss, stating that he was previously around 230 pounds. He has not had a pulmonary function test (PFT) done yet but is willing to undergo one if necessary.

## 2024-11-04 ENCOUNTER — APPOINTMENT (OUTPATIENT)
Dept: CARDIOLOGY | Facility: CLINIC | Age: 29
End: 2024-11-04

## 2024-11-13 ENCOUNTER — APPOINTMENT (OUTPATIENT)
Dept: INTERNAL MEDICINE | Facility: CLINIC | Age: 29
End: 2024-11-13

## 2025-02-28 NOTE — ED ADULT TRIAGE NOTE - DOMESTIC TRAVEL HIGH RISK QUESTION
Date of Service:  02/27/2025    The patient is a 73-year-old male accompanied by his wife kindly referred for re-evaluation of sleep apnea syndrome.  The patient has sustained a CVA, and Neurology requests an updated sleep study.    The patient was last seen by me in March 2017, and he was previously evaluated for sleep apnea syndrome at that time, superimposed on an extensive medical history of angina, hypertension, TIA, gastroesophageal reflux disease, degenerative disc disease, heterozygous factor V Leiden mutation and hereditary antithrombin III deficiency, hyperlipidemia, and morbid obesity.  Other diagnoses listed include atherosclerosis, moderate aortic stenosis, hypertension, status post TAVR, degenerative joint disease, etc.  The current medications as listed in the electronic medical record are reviewed.    The patient has been using his wife's CPAP machine, which has a fixed pressure of 9 cm.  The data download from [employer] February 27, 2025, showed CPAP usage on 86/90 nights averaging 6.1 hours of nightly usage without significant mask leakage or residual AHI.    PHYSICAL EXAMINATION:  GENERAL:  Noteworthy for an obese male, BMI 38.  HEENT:  The pharynx showed a Ye palate position 2.  NECK:  There was no adenopathy or thyromegaly.  LUNGS:  Clear to auscultation.  HEART:  Tones were regular.  EXTREMITIES:  Stasis changes and trace edema.    IMPRESSION:  Sleep apnea syndrome.    COMMENT:  We reviewed issues related to commercial CPAP  and the importance of updating the sleep study as the patient may require a different CPAP pressure and possibly may have a more complicated form of sleep apnea syndrome such as complex sleep apnea, which might require ASV.  The patient will undergo polysomnography, following which he will be re-evaluated.  We again reviewed the importance of never sleeping without CPAP and the importance of obtaining at least 7 hours of nightly sleep with CPAP.        Dictated  By:  Myron Villalobos MD  Signing Provider:  Myron Villalobos MD    MNK/AQT  D:  02/27/2025 12:41:40 PM  T:  02/27/2025 08:58:21 PM  Job:  [id number]        No

## 2025-03-12 ENCOUNTER — APPOINTMENT (OUTPATIENT)
Dept: OTOLARYNGOLOGY | Facility: CLINIC | Age: 30
End: 2025-03-12

## 2025-03-13 ENCOUNTER — APPOINTMENT (OUTPATIENT)
Dept: PULMONOLOGY | Facility: CLINIC | Age: 30
End: 2025-03-13

## 2025-03-21 ENCOUNTER — APPOINTMENT (OUTPATIENT)
Dept: INTERNAL MEDICINE | Facility: CLINIC | Age: 30
End: 2025-03-21

## 2025-03-25 ENCOUNTER — APPOINTMENT (OUTPATIENT)
Dept: OTOLARYNGOLOGY | Facility: CLINIC | Age: 30
End: 2025-03-25

## 2025-04-15 ENCOUNTER — NON-APPOINTMENT (OUTPATIENT)
Age: 30
End: 2025-04-15

## 2025-05-06 ENCOUNTER — APPOINTMENT (OUTPATIENT)
Dept: CARDIOLOGY | Facility: CLINIC | Age: 30
End: 2025-05-06
